# Patient Record
Sex: FEMALE | Race: WHITE | NOT HISPANIC OR LATINO | URBAN - METROPOLITAN AREA
[De-identification: names, ages, dates, MRNs, and addresses within clinical notes are randomized per-mention and may not be internally consistent; named-entity substitution may affect disease eponyms.]

---

## 2023-03-28 LAB
CALCIDIOL (25 OH VITAMIN D3) (NG/ML) IN SER/PLAS: 35 NG/ML
COBALAMIN (VITAMIN B12) (PG/ML) IN SER/PLAS: 822 PG/ML (ref 211–911)
FOLATE (NG/ML) IN SER/PLAS: >22.3 NG/ML

## 2023-09-15 PROBLEM — F32.89 OTHER SPECIFIED DEPRESSIVE EPISODES: Status: ACTIVE | Noted: 2023-09-15

## 2023-09-15 PROBLEM — F41.8 OTHER SPECIFIED ANXIETY DISORDERS: Status: ACTIVE | Noted: 2023-09-15

## 2023-09-15 PROBLEM — F41.9 ANXIETY AND DEPRESSION: Status: ACTIVE | Noted: 2023-09-15

## 2023-09-15 PROBLEM — G20.A1 PARKINSON'S DISEASE (MULTI): Status: ACTIVE | Noted: 2023-09-15

## 2023-09-15 PROBLEM — F32.A ANXIETY AND DEPRESSION: Status: ACTIVE | Noted: 2023-09-15

## 2023-09-15 RX ORDER — LITHIUM CARBONATE 300 MG/1
1 TABLET, FILM COATED, EXTENDED RELEASE ORAL NIGHTLY
COMMUNITY
Start: 2022-04-22

## 2023-09-15 RX ORDER — FLUTICASONE PROPIONATE 50 MCG
1 SPRAY, SUSPENSION (ML) NASAL DAILY
COMMUNITY

## 2023-09-15 RX ORDER — FLUOXETINE HYDROCHLORIDE 40 MG/1
40 CAPSULE ORAL
COMMUNITY
End: 2023-10-17 | Stop reason: ENTERED-IN-ERROR

## 2023-09-15 RX ORDER — LORAZEPAM 2 MG/1
2 TABLET ORAL
COMMUNITY
End: 2023-10-17 | Stop reason: ENTERED-IN-ERROR

## 2023-09-15 RX ORDER — LAMOTRIGINE 100 MG/1
100 TABLET ORAL
COMMUNITY
End: 2023-10-17 | Stop reason: ENTERED-IN-ERROR

## 2023-09-15 RX ORDER — PROPRANOLOL HYDROCHLORIDE 20 MG/1
40 TABLET ORAL 2 TIMES DAILY
COMMUNITY

## 2023-09-15 RX ORDER — LAMOTRIGINE 150 MG/1
150 TABLET ORAL 2 TIMES DAILY
COMMUNITY

## 2023-09-15 RX ORDER — CARBIDOPA AND LEVODOPA 25; 100 MG/1; MG/1
TABLET ORAL
COMMUNITY
Start: 2022-06-21 | End: 2024-03-05 | Stop reason: SDUPTHER

## 2023-09-15 RX ORDER — SERTRALINE HYDROCHLORIDE 100 MG/1
2 TABLET, FILM COATED ORAL DAILY
COMMUNITY
Start: 2022-04-22

## 2023-09-15 RX ORDER — GABAPENTIN 300 MG/1
300 CAPSULE ORAL EVERY MORNING
COMMUNITY
Start: 2021-11-05 | End: 2023-10-17 | Stop reason: ENTERED-IN-ERROR

## 2023-09-15 RX ORDER — GABAPENTIN 100 MG/1
200 CAPSULE ORAL 3 TIMES DAILY
COMMUNITY

## 2023-09-15 RX ORDER — SIMVASTATIN 20 MG/1
20 TABLET, FILM COATED ORAL NIGHTLY
COMMUNITY

## 2023-10-17 ENCOUNTER — TELEMEDICINE (OUTPATIENT)
Dept: BEHAVIORAL HEALTH | Facility: CLINIC | Age: 63
End: 2023-10-17
Payer: MEDICARE

## 2023-10-17 DIAGNOSIS — F32.89 OTHER SPECIFIED DEPRESSIVE EPISODES: ICD-10-CM

## 2023-10-17 DIAGNOSIS — F41.8 OTHER SPECIFIED ANXIETY DISORDERS: ICD-10-CM

## 2023-10-17 PROCEDURE — 99215 OFFICE O/P EST HI 40 MIN: CPT | Performed by: PSYCHIATRY & NEUROLOGY

## 2023-10-17 ASSESSMENT — ENCOUNTER SYMPTOMS
DEPRESSION: 1
LOSS OF SENSATION IN FEET: 0
OCCASIONAL FEELINGS OF UNSTEADINESS: 0

## 2023-10-17 NOTE — PATIENT INSTRUCTIONS
Plan:   - Continue current medications. Continue to work with current psychiatric provider to streamline medications.   - Continue counseling.   - Follow up in about 6 months.  - Call sooner ([phone]) in case of any questions or concerns.  - Call [phone] for mental health crisis or suicidal thoughts, or call 911 or go to the nearest emergency room for emergencies.

## 2023-10-17 NOTE — PROGRESS NOTES
"Outpatient Psychiatry      Reason for Visit:   Follow up for depression, anxiety    Subjective   Ms. Jodie Steel is a 63 year old woman with a history of Parkinson's disease, anxiety, depression, hyperlipidemia. Seen virtually for follow up for anxiety and depression.     She says she is \"okay.\"   She says she had a \"positive summer.\" She says she has her moments but is managing okay.   She says she joined [employer] and goes 3 times a week, and it has been helpful.   She says she usually does better in the summer. She says she is concerned about the change in weather but seems to be managing better than before. She says she used a light-box 5 years ago and it made her agitated but has started it again this week and is tolerating it.     Sleep - \"okay.\" Sleeps well most of the time. Sometimes has restlessness in legs.   Appetite - \"over the top.\" Says she gained about 15 lbs on Lithium. Says she is very conscious about diet. .   Energy - says she is always tired but still does exercise daily.   Motivation has increased slightly.   Denies any hopelessness or worthlessness.   Denies any death wishes or suicidal thoughts, intent or plans.   Nondenominational is a protective factor (Protestant).     Denies any current manic or hypomanic episodes. (She has a history of being manic and spent $[id number] when she took wellbutrin.)    No current AVH, paranoia or delusions. (Has history of seeing shadows when she first started gabapentin.)     Denies panic attacks. Has had palpitations.     Denies obsessive or compulsive behaviors.     Reports some problems with word-finding but stable compared to last visit.   Independent with ADLs and IADLs.    Drives okay but with caution. No accidents.   Independent with managing finances.     Current medications:   Sertraline 100mg twice daily  Lithium ER 300mg twice daily  Lamotrigine 150mg twice daily  Gabapentin 200mg three times daily - makes her sleepy but it worked initially for shaking. "   Propranolol 40mg twice daily  NAC 600mg twice daily.     Past medications:   Desvenlafaxine - withdrawals after it was abruptly sopped.   Fluoxetine  Bupropion - made her manic  Lorazepam - not working (does not want benzodiazepines)        Current Medications:    Current Outpatient Medications:     carbidopa-levodopa (Sinemet)  mg tablet, Take by mouth. week 1: 1/2 tab at breakfast, lunch and dinner, week 2 and onwards: 1 tab three times a day, Disp: , Rfl:     FLUoxetine (PROzac) 40 mg capsule, Take 1 capsule (40 mg) by mouth., Disp: , Rfl:     fluticasone (Flonase) 50 mcg/actuation nasal spray, Administer 1 spray into each nostril once daily. Shake gently. Before first use, prime pump. After use, clean tip and replace cap., Disp: , Rfl:     gabapentin (Neurontin) 100 mg capsule, Take 2 capsules (200 mg) by mouth 3 times a day., Disp: , Rfl:     gabapentin (Neurontin) 300 mg capsule, Take 1 capsule (300 mg) by mouth once daily in the morning., Disp: , Rfl:     lamoTRIgine (LaMICtal) 100 mg tablet, Take 1 tablet (100 mg) by mouth., Disp: , Rfl:     lamoTRIgine (LaMICtal) 150 mg tablet, Take 1 tablet (150 mg) by mouth 2 times a day., Disp: , Rfl:     lithium ER (Lithobid) 300 mg 12 hr tablet, Take 1 tablet (300 mg) by mouth once daily at bedtime., Disp: , Rfl:     LORazepam (Ativan) 2 mg tablet, Take 1 tablet (2 mg) by mouth., Disp: , Rfl:     propranolol (Inderal) 20 mg tablet, Take 1 tablet (20 mg) by mouth., Disp: , Rfl:     sertraline (Zoloft) 100 mg tablet, Take 2 tablets (200 mg) by mouth once daily., Disp: , Rfl:     simvastatin (Zocor) 20 mg tablet, Take 1 tablet (20 mg) by mouth once daily at bedtime., Disp: , Rfl:   Medical History:  No past medical history on file.  Surgical History:  No past surgical history on file.  Family History:  No family history on file.  Social History:  Social History     Socioeconomic History    Marital status:      Spouse name: Not on file    Number of  "children: Not on file    Years of education: Not on file    Highest education level: Not on file   Occupational History    Not on file   Tobacco Use    Smoking status: Not on file    Smokeless tobacco: Not on file   Substance and Sexual Activity    Alcohol use: Not on file    Drug use: Not on file    Sexual activity: Not on file   Other Topics Concern    Not on file   Social History Narrative    Not on file     Social Determinants of Health     Financial Resource Strain: Not on file   Food Insecurity: Not on file   Transportation Needs: Not on file   Physical Activity: Not on file   Stress: Not on file   Social Connections: Not on file   Intimate Partner Violence: Not on file   Housing Stability: Not on file     Record Review: moderate     Psychiatric Review Of Systems:  As above.      Medical Review Of Systems:  Constitutional: Negative  Eyes: negative  Ears, nose, mouth, throat, and face: negative  Respiratory: negative  Cardiovascular: negative  Gastrointestinal: negative  Genitourinary:negative  Integumentary: negative  Musculoskeletal:negative  Neurological: negative  Endocrine: negative.       Objective   Mental Status Exam:   Appearance: Appears to be stated age. Well groomed. Good hygiene.   Behavior/Attitude: Cooperative. Pleasant.   Motor: Psychomotor activity in average range. Tremors in hands.   Speech: Regular in rate, tone and volume. No pressure.  Mood: \"okay\"  Affect: Congruent to stated mood. Mobilized appropriately. Normal range. Seems less anxious.   Thought process: Goal-directed. Linear. Organized.  Thought content: No paranoia, delusion or ideas of reference elicited. No hallucinations in auditory, visual or other sensory modalities.   Suicidal ideation: denied.  Homicidal ideation: denied.   Insight: Fair.  Judgment: Fair.  Recent and remote memory: fair recall of recent and remote autobiographical memories.   Attention/concentration: intact during visit  Language: No aphasia or paraphasic " errors during conversation; occasional hesitation with word-finding.   Fund of knowledge: Average    Vitals:  There were no vitals filed for this visit.    Assessment/Plan   Diagnosis:   Other specified anxiety disorder  Other specified depressive disorder  Parkinson's disease    Assessment:   Ms. Jodie Steel is a 63 year old woman with a history of Parkinson's disease, anxiety, depression, hyperlipidemia. Seen virtually on Zoom for follow up for anxiety and depression.     Initial impression:   1/27/23 - She has a history of longstanding depression that was treated with SSRI/SNRI. She was taken off desvenlafaxine abruptly about 5 years ago and experienced prolonged serotonin discontinuation symptoms. She has had increased anxiety since then. She was also subsequently diagnosed with Parkinson's disease which has added to her anxiety. She has had multiple medication trials and is currently on a combination of psychiatric medications. She notes improved symptom management on this regimen although she still experiences bouts of anxiety.     She is currently in active treatment with a psychiatric NP at [facility] and would like to continue with him. She wants me to give recommendations based on the evaluation today. She also would like to follow up periodically with me in order to update and possibly eventually transfer care from her NP to me as her PD progresses.     Labs:   3/28/23 - normal B12, folate, TSH.     10/17/23 - reports fairly stable mood. She has seasonal changes in mood but seems to be managing so far.     Treatment Plan/Recommendations:  - She asked questions about possible options for treatment in case her mood symptoms worsen; discussed options including other medication changes, TMS, ketamine. We also discussed potential risks vs benefits and side effects of her current medications. She wondered if she needed to continue gabapentin and lithium or the high dose of sertraline - they seemed to help  when she was initially started on them. We discussed the risks of polypharmacy vs risk of recurrence of symptoms if medications are decreased or withdrawn. She decided to continue the same medications until the spring given her pattern or seasonal mood worsening.   - Continue current medications. Continue to work with current psychiatric provider to streamline medications.   - Continue counseling.   - Follow up in about 6 months.       Review with patient: Treatment plan reviewed with the patient.  Medication risks/benefit reviewed with the patient      Evaristo Mathews MD

## 2024-03-05 ENCOUNTER — OFFICE VISIT (OUTPATIENT)
Dept: NEUROLOGY | Facility: CLINIC | Age: 64
End: 2024-03-05
Payer: MEDICARE

## 2024-03-05 VITALS
HEIGHT: 71 IN | SYSTOLIC BLOOD PRESSURE: 117 MMHG | WEIGHT: 172.6 LBS | DIASTOLIC BLOOD PRESSURE: 71 MMHG | BODY MASS INDEX: 24.16 KG/M2 | HEART RATE: 69 BPM

## 2024-03-05 DIAGNOSIS — R41.89 COGNITIVE IMPAIRMENT: ICD-10-CM

## 2024-03-05 DIAGNOSIS — G20.A1 PARKINSON'S DISEASE WITHOUT DYSKINESIA OR FLUCTUATING MANIFESTATIONS (MULTI): ICD-10-CM

## 2024-03-05 DIAGNOSIS — M54.50 ACUTE MIDLINE LOW BACK PAIN WITHOUT SCIATICA: Primary | ICD-10-CM

## 2024-03-05 PROCEDURE — 99214 OFFICE O/P EST MOD 30 MIN: CPT | Performed by: PSYCHIATRY & NEUROLOGY

## 2024-03-05 PROCEDURE — 1160F RVW MEDS BY RX/DR IN RCRD: CPT | Performed by: PSYCHIATRY & NEUROLOGY

## 2024-03-05 PROCEDURE — 1159F MED LIST DOCD IN RCRD: CPT | Performed by: PSYCHIATRY & NEUROLOGY

## 2024-03-05 PROCEDURE — 1126F AMNT PAIN NOTED NONE PRSNT: CPT | Performed by: PSYCHIATRY & NEUROLOGY

## 2024-03-05 PROCEDURE — 1036F TOBACCO NON-USER: CPT | Performed by: PSYCHIATRY & NEUROLOGY

## 2024-03-05 RX ORDER — CARBIDOPA AND LEVODOPA 25; 100 MG/1; MG/1
1 TABLET ORAL 3 TIMES DAILY
Qty: 270 TABLET | Refills: 3 | Status: SHIPPED | OUTPATIENT
Start: 2024-03-05 | End: 2025-03-05

## 2024-03-05 ASSESSMENT — UNIFIED PARKINSONS DISEASE RATING SCALE (UPDRS)
RIGIDITY_LLE: 1
GAIT: 0
AMPLITUDE_LUE: 1
RIGIDITY_RUE: 0
LEG_AGILITY_LEFT: 1
POSTURAL_TREMOR_RIGHTHAND: 0
AMPLITUDE_LLE: 0
FREEZING_GAIT: 0
SPONTANEITY_OF_MOVEMENT: 0
CLINICAL_STATE: ON
LEVODOPA: YES
RIGIDITY_NECK: 0
AMPLITUDE_RUE: 0
HANDMOVEMENTS_RIGHT: 2
TOETAPPING_RIGHT: 1
TOTAL_SCORE: 14
AMPLITUDE_LIP_JAW: 0
PARKINSONS_MEDS: YES
PRONATION_SUPINATION_RIGHT: 2
KINETIC_TREMOR_RIGHTHAND: 0
RIGIDITY_LUE: 2
FINGER_TAPPING_RIGHT: 0
AMPLITUDE_RLE: 0
FACIAL_EXPRESSION: 0
DYSKINESIAS_PRESENT: NO
POSTURE: 0
POSTURAL_STABILITY: 0
POSTURAL_TREMOR_LEFTHAND: 0
CONSTANCY_TREMOR_ATREST: 1
CHAIR_RISING_SCALE: 0
LEG_AGILITY_RIGHT: 0
TOETAPPING_LEFT: 2
SPEECH: 0
RIGIDITY_RLE: 0
PRONATION_SUPINATION_LEFT: 0
KINETIC_TREMOR_LEFTHAND: 0
FINGER_TAPPING_LEFT: 0

## 2024-03-05 ASSESSMENT — PATIENT HEALTH QUESTIONNAIRE - PHQ9
2. FEELING DOWN, DEPRESSED OR HOPELESS: NOT AT ALL
SUM OF ALL RESPONSES TO PHQ9 QUESTIONS 1 AND 2: 0
1. LITTLE INTEREST OR PLEASURE IN DOING THINGS: NOT AT ALL

## 2024-03-05 ASSESSMENT — MONTREAL COGNITIVE ASSESSMENT (MOCA)
6. READ LIST OF DIGITS [FORWARD/BACKWARD]: 2
VISUOSPATIAL/EXECUTIVE SUBSCORE: 3
7. [VIGILENCE] TAP WHEN HEARING DESIGNATED LETTER: 1
5. MEMORY TRIALS: 0
4. NAME EACH OF THE THREE ANIMALS SHOWN: 3
9. REPEAT EACH SENTENCE: 1
8. SERIAL SUBTRACTION OF 7S: 3
WHAT LEVEL OF EDUCATION WAS ATTAINED: 0
10. [FLUENCY] NAME WORDS STARTING WITH DESIGNATED LETTER: 1
11. FOR EACH PAIR OF WORDS, WHAT CATEGORY DO THEY BELONG TO (OUT OF 2): 1
WHAT IS THE TOTAL SCORE (OUT OF 30): 23
12. MEMORY INDEX SCORE: 2
13. ORIENTATION SUBSCORE: 6

## 2024-03-05 ASSESSMENT — ENCOUNTER SYMPTOMS
LOSS OF SENSATION IN FEET: 0
OCCASIONAL FEELINGS OF UNSTEADINESS: 1
DEPRESSION: 1

## 2024-03-05 NOTE — PROGRESS NOTES
Subjective     Jodie Steel is a right handed  64 y.o. year old female who presents with Follow-up.   Visit type: follow up visit     She is having a lot of back pain.  She feels that the boxing aggravates it.  She is going to [employer] out, and she really enjoys it. It keeps her from getting isolated and really makes her feel good.      Rest tremor well controlled by carbidopa/levodopa. Has some tremors before takes her am dose of medications.  Has back stiffness only.   Denies slowness or gait changes. No shuffling or FOG.  Keeps herself mobile, walks every day with her dogs. One fall since last visit - she was lifting up her dog, and fell and hit her head. Did not need to go to ED.   Balance is a little worse than was.        Non motor symptoms:  No anosmia, constipation, urinary issues.   Her sister has told her that she yells out at nighttime, and she occasionally wakes herself up by yelling in her sleep.  Feels that memory is better than it has been, still sometimes struggles to find a word in a sentence, and she will come up w it, but she manages her finances. She feels that this is worse due to anxiety.      Current medications:  Carbidopa/levodopa IR 25/100 mg - 1.5 tabs TID (8 am, 12 pm, 4 pm)     Onset 15 minutes. No noticeable motor fluctuations. She notices tremor improves with the medication. No nausea, orthostatic lightheadedness, VH, or dyskinesias.      Prior meds:  Carbidopa/levodopa ER  mg - stopped due to insomnia           Prior Hx;   Onset: L hand tremor, noticed 2021, followed by right hand. occurs during activities , rarely occurs at rest.   Progressively worsening to affect ADLs such as opening jars, writing buttoning  Relieving factors: exercise   Aggravating factors: Emotional stress, no influence of caffeine  Family history: no hx of tremors. father had dementia   medication/ toxin exposure: Lithium was started 2 months ago (April 2022) ,  She saw Dr Carlin at [facility] and was  diagnosed with Parkinsonism.    Had PDGeneration - negative.       PMHx   HLD:  Depression and anxiety : takes gabapentin. Lamotrigine , sertraline, Lithium. she has psych follow up, [facility].   Migraine:      PShx   Knee surgery (R knee replacement)   Lasix eye surg      Allergies: Antibiotics causes GI side effects      SocHx  Lives with partner   does not smoke or take alcohol   RTD manager at [employer]     Patient Active Problem List   Diagnosis    Parkinson's disease    Anxiety and depression    Other specified depressive episodes    Other specified anxiety disorders      No past medical history on file.   No past surgical history on file.   Social History     Socioeconomic History    Marital status:      Spouse name: Not on file    Number of children: Not on file    Years of education: Not on file    Highest education level: Not on file   Occupational History    Not on file   Tobacco Use    Smoking status: Never    Smokeless tobacco: Never   Substance and Sexual Activity    Alcohol use: Not on file    Drug use: Never    Sexual activity: Not on file   Other Topics Concern    Not on file   Social History Narrative    Not on file     Social Determinants of Health     Financial Resource Strain: Not on file   Food Insecurity: Not on file   Transportation Needs: Not on file   Physical Activity: Not on file   Stress: Not on file   Social Connections: Not on file   Intimate Partner Violence: Not on file   Housing Stability: Not on file      No family history on file.   Patient Health Questionnaire-2 Score: 0          Review of Systems  All other system have been reviewed and are negative for complaint.  Objective   Vitals:    03/05/24 1448   BP: 117/71   Pulse: 69      Neurological Exam  Has significant back pain.       MDS UPDRS 1st Score: Motor Examination  Is the patient on medication for treating the symptoms of Parkinson's Disease?: Yes  Patients receiving medication for treating the  symptoms of Parkinson's Disease, karyna the patient's clinical state.: On  Is the patient on Levodopa?: Yes  Speech: 0  Facial Expression: 0  Rigidty Neck: 0  Rigidty RUE: 0  Rigidity - LUE: 2  Rigidity RLE: 0  Rigidity LLE: 1  Finger Tapping Right Hand: 0  Finger Tapping Left Hand: 0  Hand Movements- Right Hand: 2  Hand Movements- Left Hand: 1  Pronatiaon-Supination Movments - Right Hand: 2  Pronatiaon-Supination Movments Left Hand: 0  Toe Tapping Right Foot: 1  Toe Tapping - Left Foot: 2  Leg Agility - Right Le  Leg Agility - Left le  Arising from Chair: 0  Gait: 0  Freezing of Gait: 0  Postural Stability: 0 (deferred due to back pain)  Posture: 0  Global Spontanteity of Movment ( Body Bradykinesia): 0  Postural Tremor - Right Hand: 0  Postural Tremor - Left hand: 0  Kinetic Tremor - Right hand: 0  Kinetic Tremor - Left hand: 0  Rest Tremor Amplitude - RUE: 0  Rest Tremor Amplitude - LUE: 1  Rest Tremor Amplitude - RLE: 0  Rest Tremor Amplitude - LLE: 0  Rest Tremor Amplitude - Lip/Jaw: 0  Constancy of Rest Tremor: 1  MDS UPDRS Total Score: 14  Were dyskinesias (chorea or dystonia) present during examination?: No        Abstraction: 1, Attention: Read List of Digits: 2, Attention: Read List of Letters: 1, Attention: Serial Sevens: 3, Delayed Recall: 2, Language: Fluency: 1, Memory (Score '0' as this is an Unscored Section): 0, Language: Repeat: 1, Naming: 3, Orientation: 6, Visuospatial/Executive: 3, MOCA Total Score: 23         Hemoglobin A1C   Date Value Ref Range Status   2023 5.4 4.3 - 5.6 % Final     Comment:     American Diabetes Association guidelines indicate that patients with HgbA1c in the range 5.7-6.4% are at increased risk for development of diabetes, and intervention by lifestyle modification may be beneficial. HgbA1c greater or equal to 6.5% is considered diagnostic of diabetes.     Estimated Average Glucose   Date Value Ref Range Status   2023 108 mg/dL Final     Comment:     eAG:  (Estimated average glucose) is a calculated value from HgbA1c and is representative of the average blood glucose level in the last 2-3 month period.     Folate   Date Value Ref Range Status   03/28/2023 >22.3 >5.0 ng/mL Final     Comment:     Low           <3.4  Borderline 3.4-5.0  Normal        >5.0  .   Patients receiving more than 5 mg/day of biotin may have interference   in test results. A sample should be taken no sooner than eight hours   after previous dose. Contact the testing laboratory for additional   information.              Assessment/Plan       Jodie Steel is a 64 y.o. year old female here for follow up of PD. Motorically she is doing well. MoCA test today was 23/30. She has significant back pain today, that she says she gets from her exercise classes, so I advised her to do this more gently.    Plan:  Continue carbidopa/levodopa 25/100 at current dose  Continue to exercise as much as you can but be careful given the back pain - I have also sent a referral for PT  Yearly check of B12, folic acid and TSH w routine labs w pcp.   Will also order MRI brain given the cognitive changes.

## 2024-03-05 NOTE — PATIENT INSTRUCTIONS
Its was nice to see you today.   Continue carbidopa/levodopa 25/100 at current dose  Continue to exercise as much as you cna but be careful given the back pain - I have also sent a referral for PT

## 2024-03-06 ENCOUNTER — TELEPHONE (OUTPATIENT)
Dept: NEUROLOGY | Facility: CLINIC | Age: 64
End: 2024-03-06
Payer: MEDICARE

## 2024-03-06 NOTE — TELEPHONE ENCOUNTER
I called the pt and left her a detailed voicemail that Dr Castro entered and MRI for her. Left her my direct number to call back with any questions. Gave her the radiology scheduler phone number to call.

## 2024-04-04 ENCOUNTER — HOSPITAL ENCOUNTER (OUTPATIENT)
Dept: RADIOLOGY | Facility: CLINIC | Age: 64
Discharge: HOME | End: 2024-04-04
Payer: MEDICARE

## 2024-04-04 DIAGNOSIS — G20.A1 PARKINSON'S DISEASE WITHOUT DYSKINESIA OR FLUCTUATING MANIFESTATIONS (MULTI): ICD-10-CM

## 2024-04-04 DIAGNOSIS — R41.89 COGNITIVE IMPAIRMENT: ICD-10-CM

## 2024-04-04 PROCEDURE — 70551 MRI BRAIN STEM W/O DYE: CPT | Performed by: RADIOLOGY

## 2024-04-04 PROCEDURE — 70551 MRI BRAIN STEM W/O DYE: CPT

## 2024-04-05 ENCOUNTER — TELEPHONE (OUTPATIENT)
Dept: NEUROLOGY | Facility: CLINIC | Age: 64
End: 2024-04-05
Payer: MEDICARE

## 2024-04-05 NOTE — TELEPHONE ENCOUNTER
----- Message from Evelyn Starks MD sent at 4/5/2024  9:26 AM EDT -----  Regarding: RE: MRI result  Yes plesae call her  ----- Message -----  From: Dev Espinoza RN  Sent: 4/5/2024   9:08 AM EDT  To: Evelyn Starks MD  Subject: MRI result                                       Can I call her that MRI was normal- nothing there to explain cognitive complaints from recent CK visit?  ----- Message -----  From: Interface, Radiology Results In  Sent: 4/4/2024   3:48 PM EDT  To: Bridget Castro MD

## 2024-09-09 ENCOUNTER — APPOINTMENT (OUTPATIENT)
Dept: NEUROLOGY | Facility: CLINIC | Age: 64
End: 2024-09-09
Payer: MEDICARE

## 2024-09-09 ENCOUNTER — LAB (OUTPATIENT)
Dept: LAB | Facility: LAB | Age: 64
End: 2024-09-09
Payer: MEDICARE

## 2024-09-09 VITALS
SYSTOLIC BLOOD PRESSURE: 110 MMHG | HEART RATE: 67 BPM | WEIGHT: 172 LBS | HEIGHT: 71 IN | BODY MASS INDEX: 24.08 KG/M2 | DIASTOLIC BLOOD PRESSURE: 72 MMHG

## 2024-09-09 DIAGNOSIS — G62.9 NEUROPATHY: Primary | ICD-10-CM

## 2024-09-09 DIAGNOSIS — G20.A1 PARKINSON'S DISEASE WITHOUT DYSKINESIA OR FLUCTUATING MANIFESTATIONS (MULTI): ICD-10-CM

## 2024-09-09 DIAGNOSIS — R53.83 FATIGUE, UNSPECIFIED TYPE: ICD-10-CM

## 2024-09-09 DIAGNOSIS — G20.A2 PARKINSON'S DISEASE WITHOUT DYSKINESIA, WITH FLUCTUATING MANIFESTATIONS (MULTI): ICD-10-CM

## 2024-09-09 DIAGNOSIS — G62.9 NEUROPATHY: ICD-10-CM

## 2024-09-09 LAB
FOLATE SERPL-MCNC: >24 NG/ML
PROT SERPL-MCNC: 6.8 G/DL (ref 6.4–8.2)
PROT UR-ACNC: 10 MG/DL (ref 5–25)
TSH SERPL-ACNC: 1.29 MIU/L (ref 0.44–3.98)
VIT B12 SERPL-MCNC: >2000 PG/ML (ref 211–911)

## 2024-09-09 PROCEDURE — 82746 ASSAY OF FOLIC ACID SERUM: CPT

## 2024-09-09 PROCEDURE — 99213 OFFICE O/P EST LOW 20 MIN: CPT | Performed by: PSYCHIATRY & NEUROLOGY

## 2024-09-09 PROCEDURE — 3008F BODY MASS INDEX DOCD: CPT | Performed by: PSYCHIATRY & NEUROLOGY

## 2024-09-09 PROCEDURE — 84166 PROTEIN E-PHORESIS/URINE/CSF: CPT

## 2024-09-09 PROCEDURE — 84443 ASSAY THYROID STIM HORMONE: CPT

## 2024-09-09 PROCEDURE — 84166 PROTEIN E-PHORESIS/URINE/CSF: CPT | Performed by: PSYCHIATRY & NEUROLOGY

## 2024-09-09 PROCEDURE — 82607 VITAMIN B-12: CPT

## 2024-09-09 PROCEDURE — 84155 ASSAY OF PROTEIN SERUM: CPT

## 2024-09-09 PROCEDURE — 84446 ASSAY OF VITAMIN E: CPT

## 2024-09-09 PROCEDURE — 1036F TOBACCO NON-USER: CPT | Performed by: PSYCHIATRY & NEUROLOGY

## 2024-09-09 PROCEDURE — 84156 ASSAY OF PROTEIN URINE: CPT

## 2024-09-09 PROCEDURE — 84165 PROTEIN E-PHORESIS SERUM: CPT | Performed by: PSYCHIATRY & NEUROLOGY

## 2024-09-09 PROCEDURE — 84165 PROTEIN E-PHORESIS SERUM: CPT

## 2024-09-09 PROCEDURE — 36415 COLL VENOUS BLD VENIPUNCTURE: CPT

## 2024-09-09 PROCEDURE — 1159F MED LIST DOCD IN RCRD: CPT | Performed by: PSYCHIATRY & NEUROLOGY

## 2024-09-09 RX ORDER — CARBIDOPA AND LEVODOPA 25; 100 MG/1; MG/1
1 TABLET, EXTENDED RELEASE ORAL NIGHTLY
Qty: 30 TABLET | Refills: 11 | Status: SHIPPED | OUTPATIENT
Start: 2024-09-09 | End: 2025-09-09

## 2024-09-09 RX ORDER — CARBIDOPA AND LEVODOPA 25; 100 MG/1; MG/1
1.5 TABLET ORAL 3 TIMES DAILY
Qty: 405 TABLET | Refills: 3 | Status: SHIPPED | OUTPATIENT
Start: 2024-09-09 | End: 2025-09-09

## 2024-09-09 ASSESSMENT — UNIFIED PARKINSONS DISEASE RATING SCALE (UPDRS)
POSTURAL_STABILITY: 1
KINETIC_TREMOR_LEFTHAND: 0
RIGIDITY_NECK: 1
CONSTANCY_TREMOR_ATREST: 1
GAIT: 0
FINGER_TAPPING_RIGHT: 0
PRONATION_SUPINATION_RIGHT: 1
LEVODOPA: YES
AMPLITUDE_LIP_JAW: 0
LEG_AGILITY_LEFT: 1
TOETAPPING_RIGHT: 0
CHAIR_RISING_SCALE: 1
POSTURAL_TREMOR_RIGHTHAND: 0
SPEECH: 1
DYSKINESIAS_PRESENT: NO
AMPLITUDE_RLE: 0
LEG_AGILITY_RIGHT: 0
KINETIC_TREMOR_RIGHTHAND: 0
AMPLITUDE_LLE: 0
PRONATION_SUPINATION_LEFT: 2
POSTURAL_TREMOR_LEFTHAND: 0
PARKINSONS_MEDS: YES
TOTAL_SCORE: 21
RIGIDITY_LUE: 2
CLINICAL_STATE: ON
TOETAPPING_LEFT: 2
RIGIDITY_LLE: 1
AMPLITUDE_RUE: 0
FACIAL_EXPRESSION: 1
FREEZING_GAIT: 0
POSTURE: 0
SPONTANEITY_OF_MOVEMENT: 1
HANDMOVEMENTS_RIGHT: 1
RIGIDITY_RLE: 0
RIGIDITY_RUE: 0
FINGER_TAPPING_LEFT: 1
AMPLITUDE_LUE: 1

## 2024-09-09 ASSESSMENT — PATIENT HEALTH QUESTIONNAIRE - PHQ9
2. FEELING DOWN, DEPRESSED OR HOPELESS: SEVERAL DAYS
2. FEELING DOWN, DEPRESSED OR HOPELESS: SEVERAL DAYS
SUM OF ALL RESPONSES TO PHQ9 QUESTIONS 1 & 2: 2
10. IF YOU CHECKED OFF ANY PROBLEMS, HOW DIFFICULT HAVE THESE PROBLEMS MADE IT FOR YOU TO DO YOUR WORK, TAKE CARE OF THINGS AT HOME, OR GET ALONG WITH OTHER PEOPLE: SOMEWHAT DIFFICULT
1. LITTLE INTEREST OR PLEASURE IN DOING THINGS: SEVERAL DAYS
1. LITTLE INTEREST OR PLEASURE IN DOING THINGS: SEVERAL DAYS
SUM OF ALL RESPONSES TO PHQ9 QUESTIONS 1 AND 2: 2

## 2024-09-09 ASSESSMENT — ENCOUNTER SYMPTOMS
OCCASIONAL FEELINGS OF UNSTEADINESS: 1
DEPRESSION: 1
LOSS OF SENSATION IN FEET: 1

## 2024-09-09 NOTE — PATIENT INSTRUCTIONS
Discussed importance of compliance with ocular meds and follow up exams to prevent loss of vision. Parkinson's disease:  It was nice to see you today.     Carbidopa/levodopa 25/100 - one and half tab three times a day  Try carbidopa/levodopa 25/100 CR at bedtime - see if this helps you morning mobility and the generalized feeling of unwellness when you first wake up  I have ordered blood work for neuropathy, you can have this done straight after the appointment  Consider PT  RTC in 6 months.         Save the Date for The 15th Annual [facility] Parkinson's Boot Camp  Saturday, November 16th at the [place], [place]  [email or web address]     2024 event information will be posted when available.

## 2024-09-09 NOTE — PROGRESS NOTES
Subjective     Jodie Steel is a right handed  64 y.o. year old female who presents with PD follow up.   Visit type: follow up visit       Has had a bit of a down turn over the last few months/  She fell twice -she was on her bike - and she felt that her body wanted her to turn - and her brain did not.   2nd fall was down the stairs tripping on her dog/   Continues to go to [employer].   She feels she has neuropathy - and this causes her difficulty w balance, she has numbness and burning in the feet, worst is on top of the toes.  She cannot be without shoes due to the pain.  Has gotten a hand and foot cream that has been helpful.  Has continued back pain, the exercise classes helps significantly.     Rest tremor are a little worse than used to be, sometimes notices some shaking when she walks dog in am only.  Feels like crap when she first wakes up - and then feels good once she has taken meds at 715 - and wakes up at 9 am.   Denies slowness or gait changes. No shuffling or FOG.  Keeps herself mobile, walks every day with her dogs. Does rock steady boxing.   Falls: yes, as above  Balance is a little worse than was.        Non motor symptoms:  No anosmia, constipation, urinary issues.   Her sister has told her that she yells out at nighttime, and she occasionally wakes herself up by yelling in her sleep.  Difficulty falling asleep.   Feels that memory is better than it has been, still sometimes struggles to find a word in a sentence, and she will come up w it, but she manages her finances. She feels that this is worse due to anxiety.   She feels that she is more irritable and did not tolerate having her sister staying for a month.      Current medications:  Carbidopa/levodopa IR 25/100 mg - 1.5 tabs TID (8 am, 12 pm, 4 pm)     Onset 15 minutes. No noticeable motor fluctuations. She notices tremor improves with the medication. No nausea, orthostatic lightheadedness, VH, or dyskinesias.      Prior  meds:  Carbidopa/levodopa ER  mg - stopped due to insomnia           Prior Hx;   Onset: L hand tremor, noticed 2021, followed by right hand. occurs during activities , rarely occurs at rest.   Progressively worsening to affect ADLs such as opening jars, writing buttoning  Relieving factors: exercise   Aggravating factors: Emotional stress, no influence of caffeine  Family history: no hx of tremors. father had dementia   medication/ toxin exposure: Lithium was started 2 months ago (April 2022) ,  She saw Dr Carlin at [facility] and was diagnosed with Parkinsonism.    Had PDGeneration - negative.       PMHx   HLD:  Depression and anxiety : takes gabapentin. Lamotrigine , sertraline, Lithium. she has psych follow up, [facility] [place].   Migraine:      PShx   Knee surgery (R knee replacement)   Lasix eye surg      Allergies: Antibiotics causes GI side effects      SocHx  Lives with partner   does not smoke or take alcohol   RTD manager at [employer]     Patient Active Problem List   Diagnosis    Parkinson's disease (Multi)    Anxiety and depression    Other specified depressive episodes    Other specified anxiety disorders      History reviewed. No pertinent past medical history.   History reviewed. No pertinent surgical history.   Social History     Socioeconomic History    Marital status:      Spouse name: Not on file    Number of children: Not on file    Years of education: Not on file    Highest education level: Not on file   Occupational History    Not on file   Tobacco Use    Smoking status: Never    Smokeless tobacco: Never   Substance and Sexual Activity    Alcohol use: Not on file    Drug use: Never    Sexual activity: Not on file   Other Topics Concern    Not on file   Social History Narrative    Not on file     Social Determinants of Health     Financial Resource Strain: Low Risk  (2/4/2023)    [facility]    Overall Financial Resource Strain (CARDIA)      Difficulty of Paying Living Expenses: Not hard at all   Food Insecurity: No Food Insecurity (2/4/2023)    Received from [facility]    Hunger Vital Sign     Worried About Running Out of Food in the Last Year: Never true     Ran Out of Food in the Last Year: Never true   Transportation Needs: No Transportation Needs (2/4/2023)    Received from [facility]    PRAPARE - Transportation     Lack of Transportation (Medical): No     Lack of Transportation (Non-Medical): No   Physical Activity: Sufficiently Active (2/4/2023)    Received from [facility]    Exercise Vital Sign     Days of Exercise per Week: 7 days     Minutes of Exercise per Session: 40 min   Stress: No Stress Concern Present (2/4/2023)    Received from [facility]    [place] [employer] - Occupational Stress Questionnaire     Feeling of Stress : Only a little   Social Connections: Moderately Integrated (2/4/2023)    Received from [facility]    Social Connection and Isolation Panel [NHANES]     Frequency of Communication with Friends and Family: Three times a week     Frequency of Social Gatherings with Friends and Family: Once a week     Attends Buddhism Services: More than 4 times per year     Active Member of Clubs or Organizations: No     Attends Club or Organization Meetings: Never     Marital Status: Living with partner   Intimate Partner Violence: Not on file   Housing Stability: Low Risk  (2/4/2023)    Received from [facility]    Housing Stability Vital Sign     Unable to Pay for Housing in the Last Year: No     Number of Places Lived in the Last Year: 1     Unstable Housing in the Last Year: No      No family history on file.   Patient Health Questionnaire-2 Score: 2          Review of Systems  All other system have been reviewed and are negative for complaint.  Objective   Vitals:     24 1412   BP: 110/72   Pulse: 67        Neurological Exam  Has significant back pain.       MDS UPDRS 1st Score: Motor Examination  Is the patient on medication for treating the symptoms of Parkinson's Disease?: Yes  Patients receiving medication for treating the symptoms of Parkinson's Disease, karyna the patient's clinical state.: On  Is the patient on Levodopa?: Yes  Speech: 1  Facial Expression: 1  Rigidty Neck: 1  Rigidty RUE: 0  Rigidity - LUE: 2  Rigidity RLE: 0  Rigidity LLE: 1  Finger Tapping Right Hand: 0  Finger Tapping Left Hand: 1  Hand Movements- Right Hand: 1  Hand Movements- Left Hand: 2  Pronatiaon-Supination Movments - Right Hand: 1  Pronatiaon-Supination Movments Left Hand: 2  Toe Tapping Right Foot: 0  Toe Tapping - Left Foot: 2  Leg Agility - Right Le  Leg Agility - Left le  Arising from Chair: 1  Gait: 0  Freezing of Gait: 0  Postural Stability: 1  Posture: 0  Global Spontanteity of Movment ( Body Bradykinesia): 1  Postural Tremor - Right Hand: 0  Postural Tremor - Left hand: 0  Kinetic Tremor - Right hand: 0  Kinetic Tremor - Left hand: 0  Rest Tremor Amplitude - RUE: 0  Rest Tremor Amplitude - LUE: 1  Rest Tremor Amplitude - RLE: 0  Rest Tremor Amplitude - LLE: 0  Rest Tremor Amplitude - Lip/Jaw: 0  Constancy of Rest Tremor: 1  MDS UPDRS Total Score: 21  Were dyskinesias (chorea or dystonia) present during examination?: No                  Hemoglobin A1C   Date Value Ref Range Status   2023 5.4 4.3 - 5.6 % Final     Comment:     American Diabetes Association guidelines indicate that patients with HgbA1c in the range 5.7-6.4% are at increased risk for development of diabetes, and intervention by lifestyle modification may be beneficial. HgbA1c greater or equal to 6.5% is considered diagnostic of diabetes.     Estimated Average Glucose   Date Value Ref Range Status   2023 108 mg/dL Final     Comment:     eAG: (Estimated average glucose) is a calculated value from HgbA1c  and is representative of the average blood glucose level in the last 2-3 month period.     Folate   Date Value Ref Range Status   03/28/2023 >22.3 >5.0 ng/mL Final     Comment:     Low           <3.4  Borderline 3.4-5.0  Normal        >5.0  .   Patients receiving more than 5 mg/day of biotin may have interference   in test results. A sample should be taken no sooner than eight hours   after previous dose. Contact the testing laboratory for additional   information.          MRI brain 04/2024:  FINDINGS:  CSF Spaces: The ventricles, sulci and basal cisterns are within  normal limits. There is no extra-axial fluid collection.      Parenchyma: There is no diffusion restriction abnormality to suggest  acute infarct.  There is no focal parenchymal signal abnormality.  There is no mass effect or midline shift. Cerebellar tonsils are  above the foramen magnum. Pituitary and sella are not enlarged. No  abnormal susceptibility artifact.      Paranasal Sinuses and Mastoids: Visualized paranasal sinuses and  mastoid air cells are predominant unremarkable. Major intracranial  flow voids at the skull base are unremarkable.      IMPRESSION:  No evidence of acute infarct, intracranial mass effect or midline  shift.      MACRO:    Assessment/Plan       Jodie Steel is a 64 y.o. year old female here for follow up of PD. MoCA test was 23/30 in March of 2024.  She is endorsing morning off symptoms, that we will try to address with CR dosing of levodopa at night. In addition we will  send work up for neuropathy, given that she complains of numbness and pain of the feet.   Plan:  Carbidopa/levodopa 25/100 - one and half tab three times a day  Try carbidopa/levodopa 25/100 CR at bedtime - see if this helps you morning mobility and the generalized feeling of unwellness when you first wake up  I have ordered blood work for neuropathy,   Consider PT for gait and balance, declines today  RTC in 6 months.

## 2024-09-11 LAB
ALBUMIN MFR UR ELPH: 27 %
ALBUMIN: 4.5 G/DL (ref 3.4–5)
ALPHA 1 GLOBULIN: 0.3 G/DL (ref 0.2–0.6)
ALPHA 2 GLOBULIN: 0.7 G/DL (ref 0.4–1.1)
ALPHA1 GLOB MFR UR ELPH: 10.2 %
ALPHA2 GLOB MFR UR ELPH: 21.2 %
B-GLOBULIN MFR UR ELPH: 28.8 %
BETA GLOBULIN: 0.7 G/DL (ref 0.5–1.2)
GAMMA GLOB MFR UR ELPH: 12.8 %
GAMMA GLOBULIN: 0.6 G/DL (ref 0.5–1.4)
PATH REVIEW-SERUM PROTEIN ELECTROPHORESIS: NORMAL
PATH REVIEW-URINE PROTEIN ELECTROPHORESIS: NORMAL
PROTEIN ELECTROPHORESIS COMMENT: NORMAL
URINE ELECTROPHORESIS COMMENT: NORMAL

## 2024-09-12 LAB
A-TOCOPHEROL VIT E SERPL-MCNC: 14.5 MG/L (ref 5.5–18)
BETA+GAMMA TOCOPHEROL SERPL-MCNC: 0.8 MG/L (ref 0–6)

## 2025-02-04 ENCOUNTER — OFFICE VISIT (OUTPATIENT)
Dept: URGENT CARE | Age: 65
End: 2025-02-04
Payer: MEDICARE

## 2025-02-04 VITALS
RESPIRATION RATE: 14 BRPM | OXYGEN SATURATION: 96 % | SYSTOLIC BLOOD PRESSURE: 115 MMHG | TEMPERATURE: 97.5 F | DIASTOLIC BLOOD PRESSURE: 70 MMHG | HEART RATE: 67 BPM

## 2025-02-04 DIAGNOSIS — J10.1 INFLUENZA A: Primary | ICD-10-CM

## 2025-02-04 DIAGNOSIS — Z20.822 SUSPECTED COVID-19 VIRUS INFECTION: ICD-10-CM

## 2025-02-04 DIAGNOSIS — R68.89 FLU-LIKE SYMPTOMS: ICD-10-CM

## 2025-02-04 PROBLEM — S99.921S: Status: ACTIVE | Noted: 2021-09-14

## 2025-02-04 PROBLEM — M20.42 HAMMER TOES, BILATERAL: Status: ACTIVE | Noted: 2021-09-14

## 2025-02-04 PROBLEM — F33.9 RECURRENT MAJOR DEPRESSIVE DISORDER (CMS-HCC): Status: ACTIVE | Noted: 2017-10-11

## 2025-02-04 PROBLEM — E44.0 MALNUTRITION OF MODERATE DEGREE (MULTI): Status: ACTIVE | Noted: 2017-10-12

## 2025-02-04 PROBLEM — R20.0 NUMBNESS OF FOOT: Status: ACTIVE | Noted: 2021-09-14

## 2025-02-04 PROBLEM — Z96.651 S/P TOTAL KNEE ARTHROPLASTY, RIGHT: Status: ACTIVE | Noted: 2021-06-28

## 2025-02-04 PROBLEM — M20.41 HAMMER TOES, BILATERAL: Status: ACTIVE | Noted: 2021-09-14

## 2025-02-04 PROBLEM — M77.41 METATARSALGIA OF BOTH FEET: Status: ACTIVE | Noted: 2021-09-14

## 2025-02-04 PROBLEM — L25.5 CONTACT DERMATITIS DUE TO PLANT: Status: ACTIVE | Noted: 2025-02-04

## 2025-02-04 PROBLEM — M77.42 METATARSALGIA OF BOTH FEET: Status: ACTIVE | Noted: 2021-09-14

## 2025-02-04 PROBLEM — N95.1 MENOPAUSAL SYMPTOM: Status: ACTIVE | Noted: 2018-08-08

## 2025-02-04 LAB
POC RAPID INFLUENZA A: POSITIVE
POC RAPID INFLUENZA B: NEGATIVE
POC SARS-COV-2 AG BINAX: NORMAL

## 2025-02-04 RX ORDER — BENZONATATE 200 MG/1
200 CAPSULE ORAL 3 TIMES DAILY PRN
Qty: 30 CAPSULE | Refills: 0 | Status: SHIPPED | OUTPATIENT
Start: 2025-02-04

## 2025-02-04 RX ORDER — PREDNISONE 20 MG/1
40 TABLET ORAL DAILY
Qty: 10 TABLET | Refills: 0 | Status: SHIPPED | OUTPATIENT
Start: 2025-02-04 | End: 2025-02-09

## 2025-02-04 ASSESSMENT — ENCOUNTER SYMPTOMS
COUGH: 1
RHINORRHEA: 1
SINUS PRESSURE: 1
SORE THROAT: 0
VOMITING: 0
DIARRHEA: 0
CHILLS: 1
ABDOMINAL PAIN: 0
NAUSEA: 0
CARDIOVASCULAR NEGATIVE: 1
FATIGUE: 1
GASTROINTESTINAL NEGATIVE: 1
FEVER: 1
HEADACHES: 1

## 2025-02-04 NOTE — PROGRESS NOTES
Subjective   Patient ID: Jodie Steel is a 64 y.o. female. They present today with a chief complaint of Cough (X 4 days ), Fatigue (X 4 days ), and Headache (X 4 days ).    History of Present Illness  Subjective  History was provided by the patient.  Jodie Steel is a 64 y.o. female who presents for evaluation of symptoms of a URI. Symptoms include dry cough, headache, post nasal drip, and sinus and nasal congestion. Onset of symptoms was 4 days ago, gradually worsening since that time. Associated symptoms include achiness and low grade fever. She is drinking plenty of fluids. Evaluation to date: none. Treatment to date: none    Objective  /70   Pulse 67   Temp 36.4 °C (97.5 °F)   Resp 14   SpO2 96%     Assessment/Plan  influenza    Discussed diagnosis and treatment of URI.  Suggested symptomatic OTC remedies.  Nasal saline spray for congestion.  Follow up as needed.        History provided by:  Patient and medical records  Cough  Associated symptoms include chills, a fever, headaches and rhinorrhea. Pertinent negatives include no ear pain or sore throat.   Fatigue  Associated symptoms: congestion, cough, fatigue, fever, headaches and rhinorrhea    Associated symptoms: no abdominal pain, no diarrhea, no ear pain, no nausea, no sore throat and no vomiting    Headache  Associated symptoms: congestion, cough, fatigue, fever and sinus pressure    Associated symptoms: no abdominal pain, no diarrhea, no ear pain, no nausea, no sore throat and no vomiting        Past Medical History  Allergies as of 02/04/2025 - Reviewed 02/04/2025   Allergen Reaction Noted    Poison ivy Hives 09/14/2001       (Not in a hospital admission)       History reviewed. No pertinent past medical history.    History reviewed. No pertinent surgical history.     reports that she has never smoked. She has never used smokeless tobacco. She reports that she does not use drugs.    Review of Systems  Review of Systems   Constitutional:   Positive for chills, fatigue and fever.   HENT:  Positive for congestion, rhinorrhea and sinus pressure. Negative for ear pain and sore throat.    Respiratory:  Positive for cough.    Cardiovascular: Negative.    Gastrointestinal: Negative.  Negative for abdominal pain, diarrhea, nausea and vomiting.   Genitourinary: Negative.    Neurological:  Positive for headaches.   All other systems reviewed and are negative.                                 Objective    Vitals:    02/04/25 1248   BP: 115/70   Pulse: 67   Resp: 14   Temp: 36.4 °C (97.5 °F)   SpO2: 96%     No LMP recorded.    Physical Exam  Vitals and nursing note reviewed.   Constitutional:       General: She is not in acute distress.     Appearance: Normal appearance. She is ill-appearing.   HENT:      Head: Atraumatic.      Right Ear: A middle ear effusion is present.      Left Ear: A middle ear effusion is present.      Nose: Mucosal edema, congestion and rhinorrhea present. Rhinorrhea is purulent.      Right Turbinates: Swollen.      Left Turbinates: Swollen.      Mouth/Throat:      Lips: Pink.      Mouth: Mucous membranes are moist.      Pharynx: Uvula midline. Posterior oropharyngeal erythema and postnasal drip present.   Cardiovascular:      Rate and Rhythm: Normal rate and regular rhythm.      Pulses: Normal pulses.      Heart sounds: Normal heart sounds.   Pulmonary:      Effort: Pulmonary effort is normal.      Breath sounds: Normal breath sounds and air entry. No decreased breath sounds or wheezing.   Musculoskeletal:      Cervical back: Normal range of motion and neck supple.   Skin:     General: Skin is warm and dry.      Capillary Refill: Capillary refill takes less than 2 seconds.   Neurological:      Mental Status: She is alert and oriented to person, place, and time.   Psychiatric:         Behavior: Behavior normal.         Procedures    Point of Care Test & Imaging Results from this visit  Results for orders placed or performed in visit on  02/04/25   POCT Covid-19 Rapid Antigen   Result Value Ref Range    POC SACHIN-COV-2 AG  Presumptive negative test for SARS-CoV-2 (no antigen detected)     Presumptive negative test for SARS-CoV-2 (no antigen detected)   POCT Influenza A/B manually resulted   Result Value Ref Range    POC Rapid Influenza A Positive (A) Negative    POC Rapid Influenza B Negative Negative      No results found.    Diagnostic study results (if any) were reviewed by SARA Cuevas.    Assessment/Plan   Allergies, medications, history, and pertinent labs/EKGs/Imaging reviewed by SARA Cuevas.     Medical Decision Making  Risks, benefits, and alternatives of the medications and treatment plan prescribed today were discussed, and patient expressed understanding. Plan follow up as discussed or as needed if any worsening symptoms or change in condition. Reinforced red flags including (but not limited to): severe or worsening pain; difficulty swallowing; stiff neck; shortness of breath; coughing or vomiting blood; chest pain; and new or increased fever are indications to go to the Emergency Department.  At time of discharge patient was clinically well-appearing and HDS for outpatient management. The patient and/or family was educated regarding diagnosis, supportive care, OTC and Rx medications. The patient and/or family was given the opportunity to ask questions prior to discharge.  They verbalized understanding of my discussion of the plans for treatment, expected course, indications to return to  or seek further evaluation in ED, and the need for timely follow up as directed.   They were provided with a work/school excuse if requested. The after-visit summary was given to the patient and care instructions were reviewed with the patient. All questions were answered and the patient verbalized understanding of the plan of care for today.  Plan:  Recent visit notes reviewed  Meds as above  Increase clear fluids  Pcp  follow up this week if not improving or worsening  ER visit anytime 24/7 for acute worsening or changing condition      Orders and Diagnoses  Diagnoses and all orders for this visit:  Influenza A  -     predniSONE (Deltasone) 20 mg tablet; Take 2 tablets (40 mg) by mouth once daily for 5 days.  -     benzonatate (Tessalon) 200 mg capsule; Take 1 capsule (200 mg) by mouth 3 times a day as needed for cough. Do not crush or chew.  Suspected COVID-19 virus infection  -     POCT Covid-19 Rapid Antigen  Flu-like symptoms  -     POCT Influenza A/B manually resulted      Medical Admin Record      Patient disposition: Home    Electronically signed by SARA Cuevas  1:25 PM

## 2025-02-13 ENCOUNTER — ANCILLARY PROCEDURE (OUTPATIENT)
Dept: URGENT CARE | Age: 65
End: 2025-02-13
Payer: MEDICARE

## 2025-02-13 ENCOUNTER — OFFICE VISIT (OUTPATIENT)
Dept: URGENT CARE | Age: 65
End: 2025-02-13
Payer: MEDICARE

## 2025-02-13 VITALS
WEIGHT: 167 LBS | DIASTOLIC BLOOD PRESSURE: 72 MMHG | BODY MASS INDEX: 22.62 KG/M2 | RESPIRATION RATE: 18 BRPM | OXYGEN SATURATION: 97 % | TEMPERATURE: 98.7 F | SYSTOLIC BLOOD PRESSURE: 119 MMHG | HEART RATE: 79 BPM | HEIGHT: 72 IN

## 2025-02-13 DIAGNOSIS — R05.1 ACUTE COUGH: Primary | ICD-10-CM

## 2025-02-13 DIAGNOSIS — R05.1 ACUTE COUGH: ICD-10-CM

## 2025-02-13 PROCEDURE — 71046 X-RAY EXAM CHEST 2 VIEWS: CPT | Performed by: PHYSICIAN ASSISTANT

## 2025-02-13 RX ORDER — BROMPHENIRAMINE MALEATE, PSEUDOEPHEDRINE HYDROCHLORIDE, AND DEXTROMETHORPHAN HYDROBROMIDE 2; 30; 10 MG/5ML; MG/5ML; MG/5ML
10 SYRUP ORAL 4 TIMES DAILY PRN
Qty: 120 ML | Refills: 0 | Status: SHIPPED | OUTPATIENT
Start: 2025-02-13 | End: 2025-02-23

## 2025-02-13 ASSESSMENT — ENCOUNTER SYMPTOMS: COUGH: 1

## 2025-02-13 NOTE — PROGRESS NOTES
Subjective   Patient ID: Jodie Steel is a 65 y.o. female. They present today with a chief complaint of Cough (2 weeks/Cough, fatigue).    History of Present Illness  This is a 65-year-old female presents to the urgent care with complaints of a cough for the past 2 weeks.  Patient recently was diagnosed with influenza A on 2/4.  Patient was given symptomatic treatment.  Patient states she has had no improvement of her symptoms.  Patient denies any other systemic complaints at this time.      Cough        Past Medical History  Allergies as of 02/13/2025 - Reviewed 02/13/2025   Allergen Reaction Noted    Poison ivy Hives 09/14/2001       (Not in a hospital admission)       History reviewed. No pertinent past medical history.    History reviewed. No pertinent surgical history.     reports that she has never smoked. She has never used smokeless tobacco. She reports that she does not use drugs.    Review of Systems  Review of Systems   Respiratory:  Positive for cough.    All other systems reviewed and are negative.                                 Objective    Vitals:    02/13/25 1127   BP: 119/72   BP Location: Left arm   Patient Position: Sitting   BP Cuff Size: Adult   Pulse: 79   Resp: 18   Temp: 37.1 °C (98.7 °F)   TempSrc: Oral   SpO2: 97%   Weight: 75.8 kg (167 lb)   Height: 1.829 m (6')     No LMP recorded. Patient is postmenopausal.    Physical Exam  Vitals and nursing note reviewed.   Constitutional:       Appearance: Normal appearance.   HENT:      Head: Normocephalic and atraumatic.      Right Ear: Ear canal and external ear normal.      Left Ear: Ear canal and external ear normal.      Ears:      Comments: Middle ear fluid bilaterally     Nose: Nose normal.      Mouth/Throat:      Mouth: Mucous membranes are moist.      Pharynx: Oropharynx is clear.   Eyes:      Extraocular Movements: Extraocular movements intact.      Conjunctiva/sclera: Conjunctivae normal.   Cardiovascular:      Rate and Rhythm:  Normal rate and regular rhythm.   Pulmonary:      Effort: Pulmonary effort is normal.      Breath sounds: Normal breath sounds.      Comments: +bronchospastic cough  Skin:     General: Skin is warm and dry.   Neurological:      General: No focal deficit present.      Mental Status: She is alert and oriented to person, place, and time.   Psychiatric:         Mood and Affect: Mood normal.         Behavior: Behavior normal.         Procedures    Point of Care Test & Imaging Results from this visit  No results found for this visit on 02/13/25.   XR chest 2 views    Result Date: 2/13/2025  Interpreted By:  Chano Deng, STUDY: XR CHEST 2 VIEWS;  2/13/2025 12:01 pm   INDICATION: Signs/Symptoms:cough, recent flu.   COMPARISON: None.   ACCESSION NUMBER(S): [id number]   ORDERING CLINICIAN: NÉSTOR LEIJA   FINDINGS:     CARDIOMEDIASTINAL SILHOUETTE: Cardiomediastinal silhouette is unremarkable in size and configuration.   LUNGS: No consolidation, pneumothorax, or significant effusion.   ABDOMEN: No remarkable upper abdominal findings.   BONES: No acute osseous changes. Bilateral breast implants are noted. There is a somewhat collapsed appearance of the right breast implant, incompletely evaluated via plain radiography.       1.  No evidence of acute cardiopulmonary process.     Signed by: Chano Deng 2/13/2025 12:10 PM Dictation workstation:   [id number]     Diagnostic study results (if any) were reviewed by Néstor Leija PA-C.    Assessment/Plan   Allergies, medications, history, and pertinent labs/EKGs/Imaging reviewed by Néstor Leija PA-C.     Medical Decision Making  Cough-> patient recently had influenza A, chest x-ray performed today revealed no acute cardiopulmonary process.  Lungs CTAB. No wheezing or extra work of breathing noted. Patient is afebrile nontoxic-appearing no acute distress.  No hypoxia.  Bronchospastic cough on examination.  Discussed likely viral and to continue taking  over-the-counter medication use Bromfed to help with symptoms.  Red flags discussed when to go to the emergency department if symptoms do not improve or worsen and patient is agreeable.    Orders and Diagnoses  Diagnoses and all orders for this visit:  Acute cough  -     XR chest 2 views; Future  -     brompheniramine-pseudoeph-DM 2-30-10 mg/5 mL syrup; Take 10 mL by mouth 4 times a day as needed for congestion or cough for up to 10 days.      Medical Admin Record      Patient disposition: Home    Electronically signed by Zonia Leija PA-C  12:20 PM

## 2025-03-03 ENCOUNTER — OFFICE VISIT (OUTPATIENT)
Dept: NEUROLOGY | Facility: CLINIC | Age: 65
End: 2025-03-03
Payer: MEDICARE

## 2025-03-03 VITALS
DIASTOLIC BLOOD PRESSURE: 72 MMHG | WEIGHT: 167 LBS | BODY MASS INDEX: 23.38 KG/M2 | SYSTOLIC BLOOD PRESSURE: 108 MMHG | HEIGHT: 71 IN | HEART RATE: 76 BPM

## 2025-03-03 DIAGNOSIS — G20.A2 PARKINSON'S DISEASE WITHOUT DYSKINESIA, WITH FLUCTUATING MANIFESTATIONS: Primary | ICD-10-CM

## 2025-03-03 DIAGNOSIS — G20.A1 PARKINSON'S DISEASE WITHOUT DYSKINESIA OR FLUCTUATING MANIFESTATIONS: ICD-10-CM

## 2025-03-03 PROCEDURE — 1159F MED LIST DOCD IN RCRD: CPT | Performed by: PSYCHIATRY & NEUROLOGY

## 2025-03-03 PROCEDURE — G2211 COMPLEX E/M VISIT ADD ON: HCPCS | Performed by: PSYCHIATRY & NEUROLOGY

## 2025-03-03 PROCEDURE — 99214 OFFICE O/P EST MOD 30 MIN: CPT | Mod: GC | Performed by: PSYCHIATRY & NEUROLOGY

## 2025-03-03 PROCEDURE — 99214 OFFICE O/P EST MOD 30 MIN: CPT | Performed by: PSYCHIATRY & NEUROLOGY

## 2025-03-03 PROCEDURE — 3008F BODY MASS INDEX DOCD: CPT | Performed by: PSYCHIATRY & NEUROLOGY

## 2025-03-03 RX ORDER — CARBIDOPA AND LEVODOPA 25; 100 MG/1; MG/1
2 TABLET ORAL 3 TIMES DAILY
Qty: 540 TABLET | Refills: 3 | Status: SHIPPED | OUTPATIENT
Start: 2025-03-03 | End: 2026-03-03

## 2025-03-03 ASSESSMENT — PATIENT HEALTH QUESTIONNAIRE - PHQ9
2. FEELING DOWN, DEPRESSED OR HOPELESS: SEVERAL DAYS
SUM OF ALL RESPONSES TO PHQ9 QUESTIONS 1 AND 2: 2
1. LITTLE INTEREST OR PLEASURE IN DOING THINGS: SEVERAL DAYS
2. FEELING DOWN, DEPRESSED OR HOPELESS: SEVERAL DAYS
1. LITTLE INTEREST OR PLEASURE IN DOING THINGS: SEVERAL DAYS
SUM OF ALL RESPONSES TO PHQ9 QUESTIONS 1 & 2: 2

## 2025-03-03 ASSESSMENT — UNIFIED PARKINSONS DISEASE RATING SCALE (UPDRS)
KINETIC_TREMOR_LEFTHAND: 0
MINUTES_SINCE_LEVODOPA: 3 HR
AMPLITUDE_LIP_JAW: 0
RIGIDITY_RUE: 1
CONSTANCY_TREMOR_ATREST: 2
FREEZING_GAIT: 0
LEG_AGILITY_RIGHT: 0
AMPLITUDE_LUE: 1
KINETIC_TREMOR_RIGHTHAND: 0
TOTAL_SCORE: 32
FINGER_TAPPING_RIGHT: 0
POSTURAL_STABILITY: 2
PRONATION_SUPINATION_LEFT: 2
POSTURAL_TREMOR_RIGHTHAND: 0
RIGIDITY_NECK: 0
SPONTANEITY_OF_MOVEMENT: 2
PRONATION_SUPINATION_RIGHT: 1
GAIT: 2
CHAIR_RISING_SCALE: 2
POSTURE: 1
LEVODOPA: YES
AMPLITUDE_RUE: 0
POSTURAL_TREMOR_LEFTHAND: 1
SPEECH: 1
RIGIDITY_LLE: 1
TOETAPPING_LEFT: 2
PARKINSONS_MEDS: YES
FACIAL_EXPRESSION: 2
AMPLITUDE_RLE: 0
TOETAPPING_RIGHT: 0
RIGIDITY_RLE: 0
HANDMOVEMENTS_RIGHT: 1
FINGER_TAPPING_LEFT: 2
AMPLITUDE_LLE: 0
RIGIDITY_LUE: 3
CLINICAL_STATE: ON
DYSKINESIAS_PRESENT: NO
LEG_AGILITY_LEFT: 1

## 2025-03-03 ASSESSMENT — ENCOUNTER SYMPTOMS
OCCASIONAL FEELINGS OF UNSTEADINESS: 1
DEPRESSION: 1
LOSS OF SENSATION IN FEET: 0

## 2025-03-03 NOTE — PROGRESS NOTES
Subjective     Jodie Steel is a right handed  65 y.o. year old female who presents with PD follow up.   Visit type: follow up visit     Last seen 9/9/24 when she had worsening of tremors, 2 falls and concern for neuropathy.  Neuropathy workup was sent and she was started on Sinemet CR for morning wearing off and PT for balance.    Stopped Sinemet CR at bedtime because he did not see any difference in the am.    Patient reports that she had the flu for 4 weeks and after the flu subsided and she stopped taking flu medication, she started having worsening of tremors (2 weeks ago). The tremors are mainly internal and sometimes feels teeth chattering and some worsening of hand tremors. Also had worsening of contortion of face (but this is almost back to baseline now). Symptoms are worse in the morning. She thought it might be behavioral so she reached out to therapist who prescribed Abilify which she hasn't started yet. These symptoms fluctuate. Improve when she goes to the gym. Does not notice a difference when she takes her C/L.     Feels these symptoms are getting worse.    She did take prescription medications and mucinex while she had the flu.    Had 2 falls : Once trying to carry her blind/deaf dog and other foot caught on carpet.    Exercises 3 times a week and reports doing a lot of balance exercises. Did not know she had a referral to PT for balance.    Non motor symptoms:  No anosmia, constipation, urinary issues.   Her sister has told her that she yells out at nighttime, and she occasionally wakes herself up by yelling in her sleep.  Difficulty falling asleep.   Feels that memory is better than it has been, still sometimes struggles to find a word in a sentence, and she will come up w it, but she manages her finances. She feels that this is worse due to anxiety.   She feels that she is more irritable and did not tolerate having her sister staying for a month.      Current medications:  Carbidopa/levodopa  IR 25/100 mg - 1.5 tabs TID (7am, 12 pm, 4 pm)     No noticeable motor fluctuations. She notices tremor improves with the medication. No nausea, orthostatic lightheadedness, VH (sometimes spots on the floor start moving), or dyskinesias.      Prior meds:  Carbidopa/levodopa ER  mg - stopped due to insomnia  Carbidopa/levodopa ER  mg - stopped due to no effect        Prior Hx;   Onset: L hand tremor, noticed 2021, followed by right hand. occurs during activities , rarely occurs at rest.   Progressively worsening to affect ADLs such as opening jars, writing buttoning  Relieving factors: exercise   Aggravating factors: Emotional stress, no influence of caffeine  Family history: no hx of tremors. father had dementia   medication/ toxin exposure: Lithium was started 2 months ago (April 2022) ,  She saw Dr Carlin at [facility] and was diagnosed with Parkinsonism.    Had PDGeneration - negative.       Past medical history:  HLD:  Depression and anxiety : takes gabapentin. Lamotrigine , sertraline, Lithium. she has psych follow up, [facility] psychiatry.   Migraine:      Past surgical history:  Knee surgery (R knee replacement)   Lasix eye surg      Allergies: Antibiotics causes GI side effects      Social history:  Lives with partner   does not smoke or take alcohol   RTD manager at [employer]     Patient Active Problem List   Diagnosis    Parkinson's disease (Multi)    Anxiety and depression    Other specified depressive episodes    Other specified anxiety disorders    Contact dermatitis due to plant    Hammer toes, bilateral    Hyperlipidemia    Malnutrition of moderate degree (Multi)    Menopausal symptom    Metatarsalgia of both feet    Migraine without aura    Numbness of foot    Osteoarthritis of right knee    Plantar plate injury, right, sequela    Radial styloid tenosynovitis    Recurrent major depressive disorder (CMS-HCC)    S/P total knee arthroplasty, right      History reviewed. No pertinent past  medical history.   History reviewed. No pertinent surgical history.   Social History     Socioeconomic History    Marital status:      Spouse name: Not on file    Number of children: Not on file    Years of education: Not on file    Highest education level: Not on file   Occupational History    Not on file   Tobacco Use    Smoking status: Never    Smokeless tobacco: Never   Substance and Sexual Activity    Alcohol use: Not on file    Drug use: Never    Sexual activity: Not on file   Other Topics Concern    Not on file   Social History Narrative    Not on file     Social Drivers of Health     Financial Resource Strain: Low Risk  (12/3/2024)    Received from [facility]    Overall Financial Resource Strain (CARDIA)     Difficulty of Paying Living Expenses: Not hard at all   Food Insecurity: No Food Insecurity (12/3/2024)    Received from [facility]    Hunger Vital Sign     Worried About Running Out of Food in the Last Year: Never true     Ran Out of Food in the Last Year: Never true   Transportation Needs: No Transportation Needs (12/3/2024)    Received from [facility]    PRAPARE - Transportation     Lack of Transportation (Medical): No     Lack of Transportation (Non-Medical): No   Physical Activity: Sufficiently Active (12/3/2024)    Received from [facility]    Exercise Vital Sign     Days of Exercise per Week: 3 days     Minutes of Exercise per Session: 70 min   Stress: Stress Concern Present (12/3/2024)    Received from [facility]    [employer] [place] [employer] - Occupational Stress Questionnaire     Feeling of Stress : To some extent   Social Connections: Socially Integrated (12/3/2024)    Received from [facility]    Social Connection and Isolation Panel [NHANES]     Frequency of Communication with Friends and Family: More than three times a week     Frequency of Social Gatherings with Friends and Family: More than three times a week     Attends [employer]  Services: More than 4 times per year     Active Member of Clubs or Organizations: Yes     Attends Club or Organization Meetings: More than 4 times per year     Marital Status: Living with partner   Intimate Partner Violence: Not on file   Housing Stability: Low Risk  (2023)    [facility]    Housing Stability Vital Sign     Unable to Pay for Housing in the Last Year: No     Number of Places Lived in the Last Year: 1     Unstable Housing in the Last Year: No      No family history on file.   Patient Health Questionnaire-2 Score: 2          Review of Systems  All other system have been reviewed and are negative for complaint.  Objective   Vitals:    25 1425   BP: 108/72   Pulse: 76          Neurological Exam  EOMI  Face symmetrical   Reduced L arm swing, dystonic L arm posture and L shoulder elevation   5 steps with pull test     MDS UPDRS 1st Score: Motor Examination  Is the patient on medication for treating the symptoms of Parkinson's Disease?: Yes  Patients receiving medication for treating the symptoms of Parkinson's Disease, karyna the patient's clinical state.: On  Is the patient on Levodopa?: Yes  Minutes since last Levodopa dose: 3 hr  Speech: 1  Facial Expression: 2  Rigidty Neck: 0  Rigidty RUE: 1  Rigidity - LUE: 3  Rigidity RLE: 0  Rigidity LLE: 1  Finger Tapping Right Hand: 0  Finger Tapping Left Hand: 2  Hand Movements- Right Hand: 1  Hand Movements- Left Hand: 2  Pronatiaon-Supination Movments - Right Hand: 1  Pronatiaon-Supination Movments Left Hand: 2  Toe Tapping Right Foot: 0  Toe Tapping - Left Foot: 2  Leg Agility - Right Le  Leg Agility - Left le  Arising from Chair: 2  Gait: 2  Freezing of Gait: 0  Postural Stability: 2  Posture: 1  Global Spontanteity of Movment ( Body Bradykinesia): 2  Postural Tremor - Right Hand: 0  Postural Tremor - Left hand: 1  Kinetic Tremor - Right hand: 0  Kinetic Tremor - Left hand: 0  Rest Tremor Amplitude - RUE:  0  Rest Tremor Amplitude - LUE: 1  Rest Tremor Amplitude - RLE: 0  Rest Tremor Amplitude - LLE: 0  Rest Tremor Amplitude - Lip/Jaw: 0  Constancy of Rest Tremor: 2  MDS UPDRS Total Score: 32  Were dyskinesias (chorea or dystonia) present during examination?: No                  Hemoglobin A1C   Date Value Ref Range Status   12/17/2024 5.4 4.3 - 5.6 % Final     Comment:     American Diabetes Association guidelines indicate that patients with HgbA1c in the range 5.7-6.4% are at increased risk for development of diabetes, and intervention by lifestyle modification may be beneficial. HgbA1c greater or equal to 6.5% is considered diagnostic of diabetes.     Estimated Average Glucose   Date Value Ref Range Status   12/17/2024 108 mg/dL Final     Comment:     eAG: (Estimated average glucose) is a calculated value from HgbA1c and is representative of the average blood glucose level in the last 2-3 month period.     Thyroid Stimulating Hormone   Date Value Ref Range Status   09/09/2024 1.29 0.44 - 3.98 mIU/L Final     Folate, Serum   Date Value Ref Range Status   09/09/2024 >24.0 >5.0 ng/mL Final       MRI brain 04/2024:  FINDINGS:  CSF Spaces: The ventricles, sulci and basal cisterns are within  normal limits. There is no extra-axial fluid collection.      Parenchyma: There is no diffusion restriction abnormality to suggest  acute infarct.  There is no focal parenchymal signal abnormality.  There is no mass effect or midline shift. Cerebellar tonsils are  above the foramen magnum. Pituitary and sella are not enlarged. No  abnormal susceptibility artifact.      Paranasal Sinuses and Mastoids: Visualized paranasal sinuses and  mastoid air cells are predominant unremarkable. Major intracranial  flow voids at the skull base are unremarkable.      IMPRESSION:  No evidence of acute infarct, intracranial mass effect or midline  shift.      MACRO:    Assessment/Plan     Jodie Steel is a 65 y.o. year old female here for follow  up of PD. MoCA test was 23/30 in March of 2024.  No benefit in early morning off with Sinemet CR so she stopped. Had 1 month longue flu now complicated by worsened tremors (mostly internal). Also, exam today is slightly worse (increased rigidity and L sided dystonia). Recommended not to start Abilify prescribed by behavioral provider.       Plan:  - We would not want you to take Abilify because it can worsen you parkinson's symptoms  - Try increasing the dose of Carbidopa/Levodopa to 2 tablets 3 times a day.  - Let us know if you would like to do PT for balance  - MoCA at next visit  - FU in 6 months       I saw and evaluated the patient. I personally obtained the key and critical portions of the history and physical exam or was physically present for key and critical portions performed by the resident/fellow. I reviewed the resident/fellow's documentation and discussed the patient with the resident/fellow. I agree with the resident/fellow's medical decision making as documented in the note.    Bridget Castro MD     For the Evaluation and Management of this patient, the level of Medical Decision Making for this visit was determined based on the following:    The level of COMPLEXITY AND NUMBER OF PROBLEMS ADDRESSED was , MODERATE,] as determined by:     MODERATE:    one chronic illnesses with exacerbation, progression or side effect of Rx.      The AMOUNT/COMPLEXITY OF DATA TO REVIEW (reviewed, ordered or call for) was [ LIMITED] as determined by:    LIMITED:  my review of prior external notes from a unique source.      The level of RISK OF COMPLICATIONS was [, MODERATE,] as determined by:    MODERATE:  prescription drug management.    Thus, the level of medical decision making (based on the lower of the two highest elements) was determined to be [, MODERATE,]. Therefore the appropriate E/M code for this encounter is 26717,

## 2025-03-03 NOTE — PATIENT INSTRUCTIONS
Pleasure meeting you today. Sorry to see that you have been dealing with so much. It is possible that you are still experiencing postviral symptoms. On our exam today we see that you could benefit from a higher dose of carbidopa/levodopa. The plan is as follows:    - We would not want you to take Abilify because it can worsen you parkinson's symptoms  - Try increasing the dose of Carbidopa/Levodopa to 2 tablets 3 times a day.  - Let us know if you would like to do PT for balance    Note: it is possible to develop side effect when increasing the dose of Carbidopa/levodopa. Side effects include but are not limited to: sleepiness, nausea, constipation, dizziness, hallucinations, or involuntary wiggling movements. If you experience any side effects please call our clinic.

## 2025-03-18 ENCOUNTER — TELEPHONE (OUTPATIENT)
Dept: NEUROLOGY | Facility: CLINIC | Age: 65
End: 2025-03-18
Payer: MEDICARE

## 2025-05-30 ENCOUNTER — APPOINTMENT (OUTPATIENT)
Dept: BEHAVIORAL HEALTH | Facility: CLINIC | Age: 65
End: 2025-05-30
Payer: MEDICARE

## 2025-05-30 DIAGNOSIS — F32.89 OTHER SPECIFIED DEPRESSIVE EPISODES: ICD-10-CM

## 2025-05-30 DIAGNOSIS — G20.A1 PARKINSON'S DISEASE, UNSPECIFIED WHETHER DYSKINESIA PRESENT, UNSPECIFIED WHETHER MANIFESTATIONS FLUCTUATE: ICD-10-CM

## 2025-05-30 DIAGNOSIS — F41.8 OTHER SPECIFIED ANXIETY DISORDERS: ICD-10-CM

## 2025-05-30 PROCEDURE — 1160F RVW MEDS BY RX/DR IN RCRD: CPT | Performed by: PSYCHIATRY & NEUROLOGY

## 2025-05-30 PROCEDURE — 1159F MED LIST DOCD IN RCRD: CPT | Performed by: PSYCHIATRY & NEUROLOGY

## 2025-05-30 PROCEDURE — 99215 OFFICE O/P EST HI 40 MIN: CPT | Performed by: PSYCHIATRY & NEUROLOGY

## 2025-05-30 RX ORDER — CETIRIZINE HYDROCHLORIDE 10 MG/1
10 TABLET, CHEWABLE ORAL
COMMUNITY

## 2025-05-30 NOTE — LETTER
"May 30, 2025     Bridget Castro MD  [address]  Department Of Neurology  [facility] [address]    Patient: Jodie Steel   YOB: 1960   Date of Visit: 5/30/2025       Dear Dr. Bridget Castro MD:    Thank you for referring Jodie Steel to me for evaluation. Below are my notes for this consultation.  If you have questions, please do not hesitate to call me. I look forward to following your patient along with you.       Sincerely,     Evaristo Mathews MD      CC: Nael Crane, SUSAN-CNP  ______________________________________________________________________________________    Outpatient Psychiatry    Ms. Jodie Steel is a 65 year old woman with a history of Parkinson's disease, anxiety, depression, hyperlipidemia. Seen virtually for follow up for anxiety and depression. She was last seen in October 2023.     She had initial evaluation with me on 1/27/23 and follow up visits but decided to continue treatment with her CNP in the community. However, she is now transferring care to me.     Virtual or Telephone Consent    An interactive audio and video telecommunication system which permits real time communications between the patient (at the originating site) and provider (at the distant site) was utilized to provide this telehealth service.   Verbal consent was requested and obtained from Jodie Steel on this date, 05/30/25 for a telehealth visit and the patient's location was confirmed at the time of the visit.    Reason for Visit:  Follow up for depression, anxiety    Subjective    She says she had a flu in the winter (February to March) - she says this lasted for a while and caused her to \"break down\" physically and mentally. She also increased carbidopa-levodopa  from 1.5 tabs 3 times daily to 2 tabs 3 times a day in early March.     She says she started using N-Acetylcysteine (NAC) and it seemed to help.     She says that now she is having trouble with being fearful in " "the dark. She says her CNP (Dev Graff, [facility]) then recommended abilify but was advised by Dr. Castro not to use it due to her Parkinson's disease.     She says she took bupropion in the past and did well on it when she was younger but then it caused her to be manic and she spent $10,000.     She says that in the evening, when it is dark, she feels afraid. She says this does not last for too long. She says she has \"memories\" of negative effects of going off pristiq. No current AVH, paranoia or delusions. (Has history of seeing shadows when she first started gabapentin.)     She says she feels anxious \"all day.\" She says if she needs to do something, she gets anxious.   She feels better when she is exercising.     She says that she had not been feeling depressed for some time but when she had the \"breakdown\" during her flu, she felt depressed. However, this is getting better.     Sleep - sleeps well on the days she works out; on other days, it is \"hit or miss.\" Says sometimes, her leg itches sometimes for our five hours before bedtime.   Appetite - \"good.\" Weight has fluctuated; recently, she is eating a lot of chocolate chip cookies.   Energy - tries to exercise regularly.   Motivation has increased slightly.   Denies any hopelessness or worthlessness.   Denies any death wishes or suicidal thoughts, intent or plans.   Restoration is a protective factor (Worship).     Denies any current manic or hypomanic episodes. (She has a history of being manic and spent $[id number] when she took wellbutrin.)    Denies panic attacks.     Denies obsessive or compulsive behaviors. However, has patterns of eating excessive sweets.     She says she was anorexic when she was 14 or 15, then became bulimic for some time then did not have any other issues.     She says her balance is \"shaky\" but notes that her strength is back to the level before she had recent flu.     She says she has recently noticed some \"dyskinesia\" - says her body " seems to be moving without her realizing it.      Current medications:   Sertraline 100mg twice daily  Lithium ER 300mg twice daily (she does not know why it had to be started)   Lamotrigine 150mg twice daily  Gabapentin 200mg - takes three in the morning, two in the afternoon and one at night (for anxiety, also helps with pain)  Propranolol 40mg twice daily  N-AcetylCysteine 600mg twice daily.    Also takes Multivitamin, B12, B complex, Biotin, Vit D3, Calcium.     Past medications:   Desvenlafaxine - withdrawals after it was abruptly sopped.   Fluoxetine  Bupropion - made her manic  Lorazepam - not working (does not want benzodiazepines)      Current Medications:    Current Outpatient Medications:   •  benzonatate (Tessalon) 200 mg capsule, Take 1 capsule (200 mg) by mouth 3 times a day as needed for cough. Do not crush or chew., Disp: 30 capsule, Rfl: 0  •  carbidopa-levodopa (Sinemet)  mg tablet, Take 2 tablets by mouth 3 times a day., Disp: 540 tablet, Rfl: 3  •  fluticasone (Flonase) 50 mcg/actuation nasal spray, Administer 1 spray into each nostril once daily. Shake gently. Before first use, prime pump. After use, clean tip and replace cap., Disp: , Rfl:   •  gabapentin (Neurontin) 100 mg capsule, Take 2 capsules (200 mg) by mouth 3 times a day., Disp: , Rfl:   •  lamoTRIgine (LaMICtal) 150 mg tablet, Take 1 tablet (150 mg) by mouth 2 times a day., Disp: , Rfl:   •  lithium ER (Lithobid) 300 mg 12 hr tablet, Take 1 tablet (300 mg) by mouth once daily at bedtime., Disp: , Rfl:   •  MULTIVITAMIN ORAL, Take by mouth., Disp: , Rfl:   •  propranolol (Inderal) 20 mg tablet, Take 2 tablets (40 mg) by mouth 2 times a day., Disp: , Rfl:   •  sertraline (Zoloft) 100 mg tablet, Take 2 tablets (200 mg) by mouth once daily., Disp: , Rfl:   •  simvastatin (Zocor) 20 mg tablet, Take 1 tablet (20 mg) by mouth once daily at bedtime., Disp: , Rfl:   •  VITAMIN B COMPLEX ORAL, Take by mouth., Disp: , Rfl:   Medical  History:  No past medical history on file.  Surgical History:  No past surgical history on file.  Family History:  No family history on file.  Social History:  Social History     Socioeconomic History   • Marital status:      Spouse name: Not on file   • Number of children: Not on file   • Years of education: Not on file   • Highest education level: Not on file   Occupational History   • Not on file   Tobacco Use   • Smoking status: Never   • Smokeless tobacco: Never   Substance and Sexual Activity   • Alcohol use: Not on file   • Drug use: Never   • Sexual activity: Not on file   Other Topics Concern   • Not on file   Social History Narrative   • Not on file     Social Drivers of Health     Financial Resource Strain: Low Risk  (12/3/2024)    Received from [facility]    Overall Financial Resource Strain (CARDIA)    • Difficulty of Paying Living Expenses: Not hard at all   Food Insecurity: No Food Insecurity (12/3/2024)    Received from [facility]    Hunger Vital Sign    • Worried About Running Out of Food in the Last Year: Never true    • Ran Out of Food in the Last Year: Never true   Transportation Needs: No Transportation Needs (12/3/2024)    Received from [facility]    PRAPARE - Transportation    • Lack of Transportation (Medical): No    • Lack of Transportation (Non-Medical): No   Physical Activity: Sufficiently Active (12/3/2024)    Received from [facility]    Exercise Vital Sign    • Days of Exercise per Week: 3 days    • Minutes of Exercise per Session: 70 min   Stress: Stress Concern Present (12/3/2024)    Received from [facility]    [facility] - Occupational Stress Questionnaire    • Feeling of Stress : To some extent   Social Connections: Socially Integrated (12/3/2024)    Received from [facility]    Social Connection and Isolation Panel [NHANES]    • Frequency of Communication with Friends and Family: More than three times a week     "• Frequency of Social Gatherings with Friends and Family: More than three times a week    • Attends Judaism Services: More than 4 times per year    • Active Member of Clubs or Organizations: Yes    • Attends Club or Organization Meetings: More than 4 times per year    • Marital Status: Living with partner   Intimate Partner Violence: Not on file   Housing Stability: Low Risk  (2/4/2023)    Received from [facility]    Housing Stability Vital Sign    • Unable to Pay for Housing in the Last Year: No    • Number of Places Lived in the Last Year: 1    • Unstable Housing in the Last Year: No     Record Review: moderate     Psychiatric Review Of Systems:  As above.      Medical Review Of Systems:  Pertinent positives as above.    Objective  Mental Status Exam:  Appearance: Appears to be stated age. Well groomed. Good hygiene.   Behavior/Attitude: Cooperative. Pleasant.   Motor: Psychomotor activity in average range.   Speech: Regular in rate, tone and volume. No pressure.  Mood: \"anxious\"  Affect: Congruent to stated mood. Mobilized appropriately. Normal range. Thought process: Goal-directed. Linear. Organized.  Thought content: No paranoia, delusion or ideas of reference elicited. No hallucinations in auditory, visual or other sensory modalities.   Suicidal ideation: denied.  Homicidal ideation: denied.   Insight: Fair.  Judgment: Fair.  Recent and remote memory: fair recall of recent and remote autobiographical memories.   Attention/concentration: intact during visit  Language: No aphasia or paraphasic errors during conversation; occasional difficulty with word retrieval.   Fund of knowledge: Average    Vitals:  There were no vitals filed for this visit.    Assessment/Plan  Diagnosis:   Other specified anxiety disorder  Other specified depressive disorder  Parkinson's disease    Assessment:   Ms. Jodie Steel is a 65 year old woman with a history of Parkinson's disease, anxiety, depression, hyperlipidemia. " Seen virtually for follow up for anxiety and depression.     Initial impression:   1/27/23 - She has a history of longstanding depression that was treated with SSRI/SNRI. She was taken off desvenlafaxine abruptly about 5 years ago and experienced prolonged serotonin discontinuation symptoms. She has had increased anxiety since then. She was also subsequently diagnosed with Parkinson's disease which has added to her anxiety. She has had multiple medication trials and is currently on a combination of psychiatric medications. She notes improved symptom management on this regimen although she still experiences bouts of anxiety.     5/30/25 - she was doing well and managing psychiatric care with DOROTA Graff at [facility]. However, she had flu in Feb/March 2025 which seemed to be protracted, and caused physical and psychiatric decompensation. Around the same time, her carbidopa-levodopa dose was increased. Now patient reports increase in anxiety as well as feeling of fear in the dark. She also reports experiencing dyskinesias which she had not had before.     Treatment Plan/Recommendations:  - Discussed that her new symptom of fear as well as reported dyskinesia seem to correlate with the increase in sinemet dose. Encouraged to discuss potentially lowering the dose with her movement disorder neurology team. Her experience with prolonged flu could also have contributed to her worsening of symptoms.   - Continue other medications for now, but may consider further adjustments if needed.   Sertraline 100mg twice daily  Lithium ER 300mg twice daily (she does not know why it had to be started)   Lamotrigine 150mg twice daily  Gabapentin 200mg - takes three in the morning, two in the afternoon and one at night (for anxiety, also helps with pain)  Propranolol 40mg twice daily  N-AcetylCysteine 600mg twice daily.  - She plans to discuss transfer of care with her CNP soon and we will then obtain release of information to obtain  treatment records.   - Follow up in about 2 months.  - Contact sooner if needed.     Review with patient: Treatment plan reviewed with the patient.  Medication risks/benefit reviewed with the patient      Evaristo Mathews MD

## 2025-05-30 NOTE — PATIENT INSTRUCTIONS
Plan:   - Recommend discussing potentially lowering the dose of sinemet with your movement disorder neurology team. This may help alleviate your symptoms.   - Continue other medications for now, but may consider further adjustments in the future if needed.   Sertraline 100mg twice daily  Lithium ER 300mg twice daily (she does not know why it had to be started)   Lamotrigine 150mg twice daily  Gabapentin 200mg - takes three in the morning, two in the afternoon and one at night (for anxiety, also helps with pain)  Propranolol 40mg twice daily  N-AcetylCysteine 600mg twice daily.  - After you discuss transfer of care with your CNP, please let me know and we will then obtain release of information to obtain treatment records.   - Follow up in about 2 months. Call [phone] to schedule.   - Contact via [facility] or call sooner ([phone]) in case of any questions or concerns.  - Call [phone] for mental health crisis or suicidal thoughts, or call 911 or go to the nearest emergency room for emergencies.    Brain-healthy lifestyle:   - Make sure your medical conditions (if any) such as diabetes, high blood pressure, high cholesterol, thyroid disease sleep apnea are optimally controlled.   - Use eyeglasses or hearing aids appropriately if needed.   - Eat a heart healthy diet (like a Mediterranean diet; lots of fruits and vegetables, fish; low fat;)  - Exercise regularly as tolerated.   - Maintain good sleep hygiene; avoid daytime naps; try to get 7 to 8 hours of continuous sleep at night.   - Stay mentally active - puzzles, word searches, books, playing cards.  - Stay socially active and engaged.

## 2025-05-30 NOTE — PROGRESS NOTES
"Outpatient Psychiatry    Ms. Jodie Steel is a 65 year old woman with a history of Parkinson's disease, anxiety, depression, hyperlipidemia. Seen virtually for follow up for anxiety and depression. She was last seen in October 2023.     She had initial evaluation with me on 1/27/23 and follow up visits but decided to continue treatment with her CNP in the community. However, she is now transferring care to me.     Virtual or Telephone Consent    An interactive audio and video telecommunication system which permits real time communications between the patient (at the originating site) and provider (at the distant site) was utilized to provide this telehealth service.   Verbal consent was requested and obtained from Jodie Steel on this date, 05/30/25 for a telehealth visit and the patient's location was confirmed at the time of the visit.    Reason for Visit:  Follow up for depression, anxiety    Subjective     She says she had a flu in the winter (February to March) - she says this lasted for a while and caused her to \"break down\" physically and mentally. She also increased carbidopa-levodopa  from 1.5 tabs 3 times daily to 2 tabs 3 times a day in early March.     She says she started using N-Acetylcysteine (NAC) and it seemed to help.     She says that now she is having trouble with being fearful in the dark. She says her CNP (Dev Graff, [facility]) then recommended abilify but was advised by Dr. Castro not to use it due to her Parkinson's disease.     She says she took bupropion in the past and did well on it when she was younger but then it caused her to be manic and she spent $10,000.     She says that in the evening, when it is dark, she feels afraid. She says this does not last for too long. She says she has \"memories\" of negative effects of going off pristiq. No current AVH, paranoia or delusions. (Has history of seeing shadows when she first started gabapentin.)     She says she feels anxious " "\"all day.\" She says if she needs to do something, she gets anxious.   She feels better when she is exercising.     She says that she had not been feeling depressed for some time but when she had the \"breakdown\" during her flu, she felt depressed. However, this is getting better.     Sleep - sleeps well on the days she works out; on other days, it is \"hit or miss.\" Says sometimes, her leg itches sometimes for our five hours before bedtime.   Appetite - \"good.\" Weight has fluctuated; recently, she is eating a lot of chocolate chip cookies.   Energy - tries to exercise regularly.   Motivation has increased slightly.   Denies any hopelessness or worthlessness.   Denies any death wishes or suicidal thoughts, intent or plans.   Orthodoxy is a protective factor (Restorationist).     Denies any current manic or hypomanic episodes. (She has a history of being manic and spent $22260 when she took wellbutrin.)    Denies panic attacks.     Denies obsessive or compulsive behaviors. However, has patterns of eating excessive sweets.     She says she was anorexic when she was 14 or 15, then became bulimic for some time then did not have any other issues.     She says her balance is \"shaky\" but notes that her strength is back to the level before she had recent flu.     She says she has recently noticed some \"dyskinesia\" - says her body seems to be moving without her realizing it.      Current medications:   Sertraline 100mg twice daily  Lithium ER 300mg twice daily (she does not know why it had to be started)   Lamotrigine 150mg twice daily  Gabapentin 200mg - takes three in the morning, two in the afternoon and one at night (for anxiety, also helps with pain)  Propranolol 40mg twice daily  N-AcetylCysteine 600mg twice daily.    Also takes Multivitamin, B12, B complex, Biotin, Vit D3, Calcium.     Past medications:   Desvenlafaxine - withdrawals after it was abruptly sopped.   Fluoxetine  Bupropion - made her manic  Lorazepam - not working " (does not want benzodiazepines)      Current Medications:    Current Outpatient Medications:     benzonatate (Tessalon) 200 mg capsule, Take 1 capsule (200 mg) by mouth 3 times a day as needed for cough. Do not crush or chew., Disp: 30 capsule, Rfl: 0    carbidopa-levodopa (Sinemet)  mg tablet, Take 2 tablets by mouth 3 times a day., Disp: 540 tablet, Rfl: 3    fluticasone (Flonase) 50 mcg/actuation nasal spray, Administer 1 spray into each nostril once daily. Shake gently. Before first use, prime pump. After use, clean tip and replace cap., Disp: , Rfl:     gabapentin (Neurontin) 100 mg capsule, Take 2 capsules (200 mg) by mouth 3 times a day., Disp: , Rfl:     lamoTRIgine (LaMICtal) 150 mg tablet, Take 1 tablet (150 mg) by mouth 2 times a day., Disp: , Rfl:     lithium ER (Lithobid) 300 mg 12 hr tablet, Take 1 tablet (300 mg) by mouth once daily at bedtime., Disp: , Rfl:     MULTIVITAMIN ORAL, Take by mouth., Disp: , Rfl:     propranolol (Inderal) 20 mg tablet, Take 2 tablets (40 mg) by mouth 2 times a day., Disp: , Rfl:     sertraline (Zoloft) 100 mg tablet, Take 2 tablets (200 mg) by mouth once daily., Disp: , Rfl:     simvastatin (Zocor) 20 mg tablet, Take 1 tablet (20 mg) by mouth once daily at bedtime., Disp: , Rfl:     VITAMIN B COMPLEX ORAL, Take by mouth., Disp: , Rfl:   Medical History:  No past medical history on file.  Surgical History:  No past surgical history on file.  Family History:  No family history on file.  Social History:  Social History     Socioeconomic History    Marital status:      Spouse name: Not on file    Number of children: Not on file    Years of education: Not on file    Highest education level: Not on file   Occupational History    Not on file   Tobacco Use    Smoking status: Never    Smokeless tobacco: Never   Substance and Sexual Activity    Alcohol use: Not on file    Drug use: Never    Sexual activity: Not on file   Other Topics Concern    Not on file   Social  History Narrative    Not on file     Social Drivers of Health     Financial Resource Strain: Low Risk  (12/3/2024)    Received from [facility]    Overall Financial Resource Strain (CARDIA)     Difficulty of Paying Living Expenses: Not hard at all   Food Insecurity: No Food Insecurity (12/3/2024)    Received from [facility]    Hunger Vital Sign     Worried About Running Out of Food in the Last Year: Never true     Ran Out of Food in the Last Year: Never true   Transportation Needs: No Transportation Needs (12/3/2024)    Received from [facility]    PRAPARE - Transportation     Lack of Transportation (Medical): No     Lack of Transportation (Non-Medical): No   Physical Activity: Sufficiently Active (12/3/2024)    Received from [facility]    Exercise Vital Sign     Days of Exercise per Week: 3 days     Minutes of Exercise per Session: 70 min   Stress: Stress Concern Present (12/3/2024)    Received from [facility]    Citizen of [place] of Occupational Health - Occupational Stress Questionnaire     Feeling of Stress : To some extent   Social Connections: Socially Integrated (12/3/2024)    Received from [facility]    Social Connection and Isolation Panel [NHANES]     Frequency of Communication with Friends and Family: More than three times a week     Frequency of Social Gatherings with Friends and Family: More than three times a week     Attends Judaism Services: More than 4 times per year     Active Member of Clubs or Organizations: Yes     Attends Club or Organization Meetings: More than 4 times per year     Marital Status: Living with partner   Intimate Partner Violence: Not on file   Housing Stability: Low Risk  (2/4/2023)    Received from [facility]    Housing Stability Vital Sign     Unable to Pay for Housing in the Last Year: No     Number of Places Lived in the Last Year: 1     Unstable Housing in the Last Year: No     Record Review: moderate     Psychiatric Review Of  "Systems:  As above.      Medical Review Of Systems:  Pertinent positives as above.    Objective   Mental Status Exam:  Appearance: Appears to be stated age. Well groomed. Good hygiene.   Behavior/Attitude: Cooperative. Pleasant.   Motor: Psychomotor activity in average range.   Speech: Regular in rate, tone and volume. No pressure.  Mood: \"anxious\"  Affect: Congruent to stated mood. Mobilized appropriately. Normal range. Thought process: Goal-directed. Linear. Organized.  Thought content: No paranoia, delusion or ideas of reference elicited. No hallucinations in auditory, visual or other sensory modalities.   Suicidal ideation: denied.  Homicidal ideation: denied.   Insight: Fair.  Judgment: Fair.  Recent and remote memory: fair recall of recent and remote autobiographical memories.   Attention/concentration: intact during visit  Language: No aphasia or paraphasic errors during conversation; occasional difficulty with word retrieval.   Fund of knowledge: Average    Vitals:  There were no vitals filed for this visit.    Assessment/Plan   Diagnosis:   Other specified anxiety disorder  Other specified depressive disorder  Parkinson's disease    Assessment:   Ms. Jodie Steel is a 65 year old woman with a history of Parkinson's disease, anxiety, depression, hyperlipidemia. Seen virtually for follow up for anxiety and depression.     Initial impression:   1/27/23 - She has a history of longstanding depression that was treated with SSRI/SNRI. She was taken off desvenlafaxine abruptly about 5 years ago and experienced prolonged serotonin discontinuation symptoms. She has had increased anxiety since then. She was also subsequently diagnosed with Parkinson's disease which has added to her anxiety. She has had multiple medication trials and is currently on a combination of psychiatric medications. She notes improved symptom management on this regimen although she still experiences bouts of anxiety.     5/30/25 - she was " doing well and managing psychiatric care with CNP Dev Graff at [facility]. However, she had flu in Feb/March 2025 which seemed to be protracted, and caused physical and psychiatric decompensation. Around the same time, her carbidopa-levodopa dose was increased. Now patient reports increase in anxiety as well as feeling of fear in the dark. She also reports experiencing dyskinesias which she had not had before.     Treatment Plan/Recommendations:  - Discussed that her new symptom of fear as well as reported dyskinesia seem to correlate with the increase in sinemet dose. Encouraged to discuss potentially lowering the dose with her movement disorder neurology team. Her experience with prolonged flu could also have contributed to her worsening of symptoms.   - Continue other medications for now, but may consider further adjustments if needed.   Sertraline 100mg twice daily  Lithium ER 300mg twice daily (she does not know why it had to be started)   Lamotrigine 150mg twice daily  Gabapentin 200mg - takes three in the morning, two in the afternoon and one at night (for anxiety, also helps with pain)  Propranolol 40mg twice daily  N-AcetylCysteine 600mg twice daily.  - She plans to discuss transfer of care with her CNP soon and we will then obtain release of information to obtain treatment records.   - Follow up in about 2 months.  - Contact sooner if needed.     Review with patient: Treatment plan reviewed with the patient.  Medication risks/benefit reviewed with the patient      Evaristo Mathews MD

## 2025-06-02 ENCOUNTER — PATIENT MESSAGE (OUTPATIENT)
Dept: BEHAVIORAL HEALTH | Facility: CLINIC | Age: 65
End: 2025-06-02
Payer: MEDICARE

## 2025-06-02 DIAGNOSIS — F41.8 OTHER SPECIFIED ANXIETY DISORDERS: ICD-10-CM

## 2025-06-02 DIAGNOSIS — F32.89 OTHER SPECIFIED DEPRESSIVE EPISODES: ICD-10-CM

## 2025-06-05 RX ORDER — LITHIUM CARBONATE 300 MG/1
300 TABLET, FILM COATED, EXTENDED RELEASE ORAL 2 TIMES DAILY
Qty: 60 TABLET | Refills: 2 | Status: SHIPPED | OUTPATIENT
Start: 2025-06-05

## 2025-06-05 RX ORDER — LAMOTRIGINE 150 MG/1
150 TABLET ORAL 2 TIMES DAILY
Qty: 60 TABLET | Refills: 2 | Status: SHIPPED | OUTPATIENT
Start: 2025-06-05

## 2025-06-05 RX ORDER — GABAPENTIN 100 MG/1
200 CAPSULE ORAL 3 TIMES DAILY
Qty: 180 CAPSULE | Refills: 2 | Status: SHIPPED | OUTPATIENT
Start: 2025-06-05

## 2025-06-05 RX ORDER — PROPRANOLOL HYDROCHLORIDE 20 MG/1
40 TABLET ORAL 2 TIMES DAILY
Qty: 120 TABLET | Refills: 2 | Status: SHIPPED | OUTPATIENT
Start: 2025-06-05

## 2025-06-05 RX ORDER — SERTRALINE HYDROCHLORIDE 100 MG/1
200 TABLET, FILM COATED ORAL DAILY
Qty: 60 TABLET | Refills: 2 | Status: SHIPPED | OUTPATIENT
Start: 2025-06-05

## 2025-06-30 ENCOUNTER — APPOINTMENT (OUTPATIENT)
Dept: NEUROLOGY | Facility: CLINIC | Age: 65
End: 2025-06-30
Payer: MEDICARE

## 2025-07-15 ENCOUNTER — TELEMEDICINE (OUTPATIENT)
Dept: BEHAVIORAL HEALTH | Facility: CLINIC | Age: 65
End: 2025-07-15
Payer: MEDICARE

## 2025-07-15 DIAGNOSIS — F41.8 OTHER SPECIFIED ANXIETY DISORDERS: ICD-10-CM

## 2025-07-15 DIAGNOSIS — F32.89 OTHER SPECIFIED DEPRESSIVE EPISODES: ICD-10-CM

## 2025-07-15 PROCEDURE — 99214 OFFICE O/P EST MOD 30 MIN: CPT | Performed by: PSYCHIATRY & NEUROLOGY

## 2025-07-15 PROCEDURE — 1160F RVW MEDS BY RX/DR IN RCRD: CPT | Performed by: PSYCHIATRY & NEUROLOGY

## 2025-07-15 PROCEDURE — 1159F MED LIST DOCD IN RCRD: CPT | Performed by: PSYCHIATRY & NEUROLOGY

## 2025-07-15 RX ORDER — BUSPIRONE HYDROCHLORIDE 5 MG/1
TABLET ORAL
Qty: 60 TABLET | Refills: 2 | Status: SHIPPED | OUTPATIENT
Start: 2025-07-15

## 2025-07-15 NOTE — PATIENT INSTRUCTIONS
Plan:   - Start buspirone 5mg once daily for 1 week, then 5mg twice daily. Monitor for any side effects or concerns. Update me in about 2 weeks or sooner if needed.   - Continue other medications:  Sertraline 100mg twice daily  Lithium ER 300mg twice daily   Lamotrigine 150mg twice daily  Gabapentin 200mg - takes three in the morning, two in the afternoon and one at night (for anxiety, also helps with pain)  Propranolol 40mg twice daily  N-AcetylCysteine 600mg twice daily.  - Request treatment records from [facility].   - Follow up in about 2 months (already scheduled).   - Contact via [employer] or call sooner ([phone]) in case of any questions or concerns.  - Call [phone] for mental health crisis or suicidal thoughts, or call [phone] or go to the nearest emergency room for emergencies.

## 2025-07-15 NOTE — PROGRESS NOTES
"Outpatient Psychiatry    Ms. Jodie Steel is a 65 year old woman with a history of Parkinson's disease, anxiety, depression, hyperlipidemia. Seen virtually for follow up for anxiety and depression.     Virtual or Telephone Consent    An interactive audio and video telecommunication system which permits real time communications between the patient (at the originating site) and provider (at the distant site) was utilized to provide this telehealth service.   Verbal consent was requested and obtained from Jodie Steel on this date, 07/15/25 for a telehealth visit and the patient's location was confirmed at the time of the visit.    Reason for Visit:  Follow up for depression, anxiety    Subjective     She says her anxiety worsened since she had the flu in February. She says that she has had two episodes of increased anxiety since February.   She says her anxiety is not that much worse.   She says the \"fear\" is prominent in the winter. She says during the summer, she does not experience much fear. She says that she did not have this \"fear\" before February; she feels she is not functioning as well as she was before February.   She says she is still concerned about discontinuation effects, which she had with abruptly stopping desvenlafaxine.     She says she decreased carbidopa-levodopa  to 1.5 tabs 3 times daily. She has noticed her dyskinesias are improved but notices slightly more tremors.     Mood is \"okay.\"   Sleep - sleeps \"on and off.\" Has trouble falling asleep at times because she has a \"weird sensation\" in her legs. Has two older dogs.   Appetite - \"good.\" Weight has fluctuated; recently, she is eating a lot of chocolate chip cookies.   Energy - tries to exercise regularly. Always feels tired.   Motivation is ok.   Denies any hopelessness or worthlessness.   Denies any death wishes or suicidal thoughts, intent or plans.   Hinduism is a protective factor (Muslim).     Denies any current manic or " hypomanic episodes.    Denies panic attacks.     Denies obsessive or compulsive behaviors. However, has patterns of eating excessive sweets.    She reports some trouble with word-finding.      She used to see CNP (Dev Graff, [facility]).     Current medications:   Sertraline 100mg twice daily  Lithium ER 300mg twice daily (she says it has helped)   Lamotrigine 150mg twice daily  Gabapentin 100mg - takes three in the morning, two in the afternoon and one at night (feels it has helped for anxiety, also helps with pain)  Propranolol 40mg twice daily  N-AcetylCysteine 600mg twice daily.    Also takes Multivitamin, B12, B complex, Biotin, Vit D3, Calcium.     Past medications:   Desvenlafaxine - withdrawals after it was abruptly stopped.   Fluoxetine  Buspirone   Bupropion - made her manic  Lorazepam - not working (does not want benzodiazepines)      Current Medications:    Current Outpatient Medications:     ACETYLCYSTEINE MISC, Inhale 1,200 mg 2 times a day. N-Acetylcysteine 600mg two pills twice daily., Disp: , Rfl:     carbidopa-levodopa (Sinemet)  mg tablet, Take 2 tablets by mouth 3 times a day., Disp: 540 tablet, Rfl: 3    cetirizine (ZyrTEC) 10 mg chewable tablet, Chew 1 tablet (10 mg)., Disp: , Rfl:     fluticasone (Flonase) 50 mcg/actuation nasal spray, Administer 1 spray into each nostril once daily. Shake gently. Before first use, prime pump. After use, clean tip and replace cap., Disp: , Rfl:     gabapentin (Neurontin) 100 mg capsule, Take 2 capsules (200 mg) by mouth 3 times a day., Disp: 180 capsule, Rfl: 2    lamoTRIgine (LaMICtal) 150 mg tablet, Take 1 tablet (150 mg) by mouth 2 times a day., Disp: 60 tablet, Rfl: 2    lithium ER (Lithobid) 300 mg 12 hr tablet, Take 1 tablet (300 mg) by mouth 2 times a day., Disp: 60 tablet, Rfl: 2    MULTIVITAMIN ORAL, Take by mouth., Disp: , Rfl:     propranolol (Inderal) 20 mg tablet, Take 2 tablets (40 mg) by mouth 2 times a day., Disp: 120 tablet, Rfl: 2     sertraline (Zoloft) 100 mg tablet, Take 2 tablets (200 mg) by mouth once daily., Disp: 60 tablet, Rfl: 2    simvastatin (Zocor) 20 mg tablet, Take 1 tablet (20 mg) by mouth once daily at bedtime., Disp: , Rfl:     VITAMIN B COMPLEX ORAL, Take by mouth., Disp: , Rfl:   Medical History:  Past Medical History:   Diagnosis Date    Anorexia nervosa 15 years old to 19years    Anxiety     Bulimia nervosa After anorexia    Chronic pain disorder Currently    Depression     Headache     Memory loss     Peripheral neuropathy      Surgical History:  No past surgical history on file.  Family History:  Family History   Problem Relation Name Age of Onset    Dementia Father Vaclovas     Depression Sister Jocelyn      Social History:  Social History     Socioeconomic History    Marital status:      Spouse name: Not on file    Number of children: Not on file    Years of education: Not on file    Highest education level: Not on file   Occupational History    Not on file   Tobacco Use    Smoking status: Never    Smokeless tobacco: Never   Substance and Sexual Activity    Alcohol use: Not on file    Drug use: Never    Sexual activity: Not Currently     Partners: Male     Birth control/protection: Post-menopausal   Other Topics Concern    Not on file   Social History Narrative    Not on file     Social Drivers of Health     Financial Resource Strain: Low Risk  (12/3/2024)    Received from [facility]    Overall Financial Resource Strain (CARDIA)     Difficulty of Paying Living Expenses: Not hard at all   Food Insecurity: No Food Insecurity (12/3/2024)    Received from [facility]    Hunger Vital Sign     Worried About Running Out of Food in the Last Year: Never true     Ran Out of Food in the Last Year: Never true   Transportation Needs: No Transportation Needs (12/3/2024)    Received from [facility]    PRAPARE - Transportation     Lack of Transportation (Medical): No     Lack of Transportation (Non-Medical): No  "  Physical Activity: Sufficiently Active (12/3/2024)    Received from [facility]    Exercise Vital Sign     Days of Exercise per Week: 3 days     Minutes of Exercise per Session: 70 min   Stress: Stress Concern Present (12/3/2024)    Received from [facility]    [place] of Occupational Health - Occupational Stress Questionnaire     Feeling of Stress : To some extent   Social Connections: Socially Integrated (12/3/2024)    Received from [facility]    Social Connection and Isolation Panel [NHANES]     Frequency of Communication with Friends and Family: More than three times a week     Frequency of Social Gatherings with Friends and Family: More than three times a week     Attends Church Services: More than 4 times per year     Active Member of Clubs or Organizations: Yes     Attends Club or Organization Meetings: More than 4 times per year     Marital Status: Living with partner   Intimate Partner Violence: Not on file   Housing Stability: Low Risk  (2/4/2023)    Received from [facility]    Housing Stability Vital Sign     Unable to Pay for Housing in the Last Year: No     Number of Places Lived in the Last Year: 1     Unstable Housing in the Last Year: No     Record Review: moderate     Psychiatric Review Of Systems:  As above.      Medical Review Of Systems:  Pertinent positives as above.    Objective   Mental Status Exam:  Appearance: Appears to be stated age. Well groomed. Good hygiene.   Behavior/Attitude: Cooperative. Pleasant.   Motor: Psychomotor activity in average range.   Speech: Regular in rate, tone and volume. No pressure.  Mood: \"okay.\"  Affect: Congruent to stated mood. Mobilized appropriately. Normal range. Thought process: Goal-directed. Linear. Organized.  Thought content: No paranoia, delusion or ideas of reference elicited. No hallucinations in auditory, visual or other sensory modalities.   Suicidal ideation: denied.  Homicidal ideation: denied.   Insight: " "Fair.  Judgment: Fair.  Recent and remote memory: fair recall of recent and remote autobiographical memories.   Attention/concentration: intact during visit  Language: No aphasia or paraphasic errors during conversation; occasional difficulty with word retrieval.   Fund of knowledge: Average    Vitals:  There were no vitals filed for this visit.    Assessment/Plan   Diagnosis:   Other specified anxiety disorder  Other specified depressive disorder  Parkinson's disease    Assessment:   Ms. Jodie Steel is a 65 year old woman with a history of Parkinson's disease, anxiety, depression, hyperlipidemia. Seen virtually for follow up for anxiety and depression.     Initial impression:   1/27/23 - She has a history of longstanding depression that was treated with SSRI/SNRI. She was taken off desvenlafaxine abruptly about 5 years ago and experienced prolonged serotonin discontinuation symptoms. She has had increased anxiety since then. She was also subsequently diagnosed with Parkinson's disease which has added to her anxiety. She has had multiple medication trials and is currently on a combination of psychiatric medications. She notes improved symptom management on this regimen although she still experiences bouts of anxiety.     7/15/25 - She has ongoing anxiety and episodes of \"fear.\"     Treatment Plan/Recommendations:  - Discussed treatment options. Will trial buspirone starting with a low dose given her other medications.   - Start buspirone 5mg once daily for 1 week, then 5mg twice daily. Monitor for any side effects or concerns. Update me in about 2 weeks or sooner if needed.   - If she tolerates buspirone, we might consider slowly tapering down gabapentin as it could be making her tired and possibly affecting cognition.   - Continue other medications:  Sertraline 100mg twice daily  Lithium ER 300mg twice daily   Lamotrigine 150mg twice daily  Gabapentin 200mg - takes three in the morning, two in the afternoon " and one at night (for anxiety, also helps with pain)  Propranolol 40mg twice daily  N-AcetylCysteine 600mg twice daily.  - Request [facility] records from [facility].   - Follow up in about 2 months (already scheduled).   - Contact sooner if needed.     Review with patient: Treatment plan reviewed with the patient.  Medication risks/benefit reviewed with the patient      Evaristo Mathesw MD

## 2025-07-17 ENCOUNTER — DOCUMENTATION (OUTPATIENT)
Dept: BEHAVIORAL HEALTH | Facility: CLINIC | Age: 65
End: 2025-07-17
Payer: MEDICARE

## 2025-09-08 ENCOUNTER — APPOINTMENT (OUTPATIENT)
Dept: NEUROLOGY | Facility: CLINIC | Age: 65
End: 2025-09-08
Payer: MEDICARE

## 2025-09-11 ENCOUNTER — APPOINTMENT (OUTPATIENT)
Dept: BEHAVIORAL HEALTH | Facility: CLINIC | Age: 65
End: 2025-09-11
Payer: MEDICARE

## 2025-09-16 ENCOUNTER — APPOINTMENT (OUTPATIENT)
Dept: BEHAVIORAL HEALTH | Facility: CLINIC | Age: 65
End: 2025-09-16
Payer: MEDICARE

## 2025-11-07 ENCOUNTER — APPOINTMENT (OUTPATIENT)
Dept: BEHAVIORAL HEALTH | Facility: CLINIC | Age: 65
End: 2025-11-07
Payer: MEDICARE